# Patient Record
Sex: MALE | Race: WHITE | NOT HISPANIC OR LATINO | Employment: OTHER | URBAN - METROPOLITAN AREA
[De-identification: names, ages, dates, MRNs, and addresses within clinical notes are randomized per-mention and may not be internally consistent; named-entity substitution may affect disease eponyms.]

---

## 2023-04-03 ENCOUNTER — HOSPITAL ENCOUNTER (INPATIENT)
Facility: HOSPITAL | Age: 63
LOS: 2 days | Discharge: HOME | DRG: 65 | End: 2023-04-05
Attending: INTERNAL MEDICINE | Admitting: INTERNAL MEDICINE
Payer: MEDICARE

## 2023-04-03 DIAGNOSIS — Z00.6 EXAMINATION FOR NORMAL COMPARISON FOR CLINICAL RESEARCH: ICD-10-CM

## 2023-04-03 PROBLEM — D64.9 ANEMIA: Status: ACTIVE | Noted: 2023-04-03

## 2023-04-03 PROBLEM — Z87.19 HISTORY OF GI BLEED: Status: ACTIVE | Noted: 2023-04-03

## 2023-04-03 PROBLEM — I65.09 VERTEBRAL ARTERY STENOSIS: Status: ACTIVE | Noted: 2023-04-03

## 2023-04-03 PROBLEM — I65.01 STENOSIS OF RIGHT VERTEBRAL ARTERY: Status: ACTIVE | Noted: 2023-04-03

## 2023-04-03 PROBLEM — I48.19 ATRIAL FIBRILLATION, PERSISTENT: Status: ACTIVE | Noted: 2023-04-03

## 2023-04-03 LAB
ABO GROUP BLD: NORMAL
ALBUMIN SERPL-MCNC: 3.9 G/DL (ref 3.5–5.2)
ALBUMIN/GLOB SERPL: 1.6 G/DL
ALP SERPL-CCNC: 81 U/L (ref 39–117)
ALT SERPL W P-5'-P-CCNC: 21 U/L (ref 1–41)
AMPHET+METHAMPHET UR QL: NEGATIVE
AMPHETAMINES UR QL: NEGATIVE
ANION GAP SERPL CALCULATED.3IONS-SCNC: 10 MMOL/L (ref 5–15)
AST SERPL-CCNC: 21 U/L (ref 1–40)
BARBITURATES UR QL SCN: NEGATIVE
BASOPHILS # BLD AUTO: 0.05 10*3/MM3 (ref 0–0.2)
BASOPHILS NFR BLD AUTO: 0.8 % (ref 0–1.5)
BENZODIAZ UR QL SCN: NEGATIVE
BILIRUB SERPL-MCNC: 0.7 MG/DL (ref 0–1.2)
BLD GP AB SCN SERPL QL: NEGATIVE
BUN SERPL-MCNC: 16 MG/DL (ref 8–23)
BUN/CREAT SERPL: 12.9 (ref 7–25)
BUPRENORPHINE SERPL-MCNC: NEGATIVE NG/ML
CALCIUM SPEC-SCNC: 8.7 MG/DL (ref 8.6–10.5)
CANNABINOIDS SERPL QL: POSITIVE
CHLORIDE SERPL-SCNC: 106 MMOL/L (ref 98–107)
CO2 SERPL-SCNC: 26 MMOL/L (ref 22–29)
COCAINE UR QL: NEGATIVE
CREAT SERPL-MCNC: 1.24 MG/DL (ref 0.76–1.27)
DEPRECATED RDW RBC AUTO: 46 FL (ref 37–54)
EGFRCR SERPLBLD CKD-EPI 2021: 64.9 ML/MIN/1.73
EOSINOPHIL # BLD AUTO: 0.03 10*3/MM3 (ref 0–0.4)
EOSINOPHIL NFR BLD AUTO: 0.5 % (ref 0.3–6.2)
ERYTHROCYTE [DISTWIDTH] IN BLOOD BY AUTOMATED COUNT: 16.7 % (ref 12.3–15.4)
GLOBULIN UR ELPH-MCNC: 2.4 GM/DL
GLUCOSE BLDC GLUCOMTR-MCNC: 153 MG/DL (ref 70–130)
GLUCOSE SERPL-MCNC: 108 MG/DL (ref 65–99)
HCT VFR BLD AUTO: 29.9 % (ref 37.5–51)
HGB BLD-MCNC: 8.2 G/DL (ref 13–17.7)
HYPOCHROMIA BLD QL: NORMAL
IMM GRANULOCYTES # BLD AUTO: 0.01 10*3/MM3 (ref 0–0.05)
IMM GRANULOCYTES NFR BLD AUTO: 0.2 % (ref 0–0.5)
IRON 24H UR-MRATE: 14 MCG/DL (ref 59–158)
IRON SATN MFR SERPL: 3 % (ref 20–50)
LYMPHOCYTES # BLD AUTO: 0.97 10*3/MM3 (ref 0.7–3.1)
LYMPHOCYTES NFR BLD AUTO: 16.3 % (ref 19.6–45.3)
MAGNESIUM SERPL-MCNC: 1.9 MG/DL (ref 1.6–2.4)
MCH RBC QN AUTO: 21.1 PG (ref 26.6–33)
MCHC RBC AUTO-ENTMCNC: 27.4 G/DL (ref 31.5–35.7)
MCV RBC AUTO: 77.1 FL (ref 79–97)
METHADONE UR QL SCN: NEGATIVE
MICROCYTES BLD QL: NORMAL
MONOCYTES # BLD AUTO: 0.29 10*3/MM3 (ref 0.1–0.9)
MONOCYTES NFR BLD AUTO: 4.9 % (ref 5–12)
NEUTROPHILS NFR BLD AUTO: 4.59 10*3/MM3 (ref 1.7–7)
NEUTROPHILS NFR BLD AUTO: 77.3 % (ref 42.7–76)
NRBC BLD AUTO-RTO: 0 /100 WBC (ref 0–0.2)
OPIATES UR QL: NEGATIVE
OXYCODONE UR QL SCN: NEGATIVE
PCP UR QL SCN: NEGATIVE
PHOSPHATE SERPL-MCNC: 3.7 MG/DL (ref 2.5–4.5)
PLAT MORPH BLD: NORMAL
PLATELET # BLD AUTO: 240 10*3/MM3 (ref 140–450)
PMV BLD AUTO: 10.7 FL (ref 6–12)
POTASSIUM SERPL-SCNC: 4.8 MMOL/L (ref 3.5–5.2)
PROPOXYPH UR QL: NEGATIVE
PROT SERPL-MCNC: 6.3 G/DL (ref 6–8.5)
RBC # BLD AUTO: 3.88 10*6/MM3 (ref 4.14–5.8)
RH BLD: POSITIVE
SODIUM SERPL-SCNC: 142 MMOL/L (ref 136–145)
T&S EXPIRATION DATE: NORMAL
TIBC SERPL-MCNC: 539 MCG/DL (ref 298–536)
TRANSFERRIN SERPL-MCNC: 362 MG/DL (ref 200–360)
TRICYCLICS UR QL SCN: NEGATIVE
WBC MORPH BLD: NORMAL
WBC NRBC COR # BLD: 5.94 10*3/MM3 (ref 3.4–10.8)

## 2023-04-03 PROCEDURE — 83735 ASSAY OF MAGNESIUM: CPT

## 2023-04-03 PROCEDURE — 86901 BLOOD TYPING SEROLOGIC RH(D): CPT

## 2023-04-03 PROCEDURE — 85007 BL SMEAR W/DIFF WBC COUNT: CPT

## 2023-04-03 PROCEDURE — 80306 DRUG TEST PRSMV INSTRMNT: CPT

## 2023-04-03 PROCEDURE — 86850 RBC ANTIBODY SCREEN: CPT

## 2023-04-03 PROCEDURE — 86900 BLOOD TYPING SEROLOGIC ABO: CPT

## 2023-04-03 PROCEDURE — 84466 ASSAY OF TRANSFERRIN: CPT

## 2023-04-03 PROCEDURE — 85025 COMPLETE CBC W/AUTO DIFF WBC: CPT

## 2023-04-03 PROCEDURE — 80053 COMPREHEN METABOLIC PANEL: CPT

## 2023-04-03 PROCEDURE — 82962 GLUCOSE BLOOD TEST: CPT

## 2023-04-03 PROCEDURE — 99223 1ST HOSP IP/OBS HIGH 75: CPT | Performed by: INTERNAL MEDICINE

## 2023-04-03 PROCEDURE — 83540 ASSAY OF IRON: CPT

## 2023-04-03 PROCEDURE — 84100 ASSAY OF PHOSPHORUS: CPT

## 2023-04-03 PROCEDURE — 63710000001 INSULIN REGULAR HUMAN PER 5 UNITS: Performed by: INTERNAL MEDICINE

## 2023-04-03 PROCEDURE — 99223 1ST HOSP IP/OBS HIGH 75: CPT | Performed by: NURSE PRACTITIONER

## 2023-04-03 RX ORDER — SODIUM CHLORIDE 9 MG/ML
75 INJECTION, SOLUTION INTRAVENOUS CONTINUOUS
Status: DISCONTINUED | OUTPATIENT
Start: 2023-04-03 | End: 2023-04-04

## 2023-04-03 RX ORDER — SODIUM CHLORIDE 0.9 % (FLUSH) 0.9 %
10 SYRINGE (ML) INJECTION AS NEEDED
Status: DISCONTINUED | OUTPATIENT
Start: 2023-04-03 | End: 2023-04-04

## 2023-04-03 RX ORDER — SODIUM CHLORIDE 9 MG/ML
40 INJECTION, SOLUTION INTRAVENOUS AS NEEDED
Status: DISCONTINUED | OUTPATIENT
Start: 2023-04-03 | End: 2023-04-04

## 2023-04-03 RX ORDER — PANTOPRAZOLE SODIUM 40 MG/10ML
40 INJECTION, POWDER, LYOPHILIZED, FOR SOLUTION INTRAVENOUS
Status: DISCONTINUED | OUTPATIENT
Start: 2023-04-04 | End: 2023-04-05 | Stop reason: HOSPADM

## 2023-04-03 RX ORDER — ATORVASTATIN CALCIUM 40 MG/1
80 TABLET, FILM COATED ORAL NIGHTLY
Status: DISCONTINUED | OUTPATIENT
Start: 2023-04-03 | End: 2023-04-05 | Stop reason: HOSPADM

## 2023-04-03 RX ORDER — ASPIRIN 325 MG
325 TABLET ORAL DAILY
Status: DISCONTINUED | OUTPATIENT
Start: 2023-04-04 | End: 2023-04-05 | Stop reason: HOSPADM

## 2023-04-03 RX ORDER — NICOTINE POLACRILEX 4 MG
15 LOZENGE BUCCAL
Status: DISCONTINUED | OUTPATIENT
Start: 2023-04-03 | End: 2023-04-05 | Stop reason: HOSPADM

## 2023-04-03 RX ORDER — SODIUM CHLORIDE 0.9 % (FLUSH) 0.9 %
10 SYRINGE (ML) INJECTION AS NEEDED
Status: DISCONTINUED | OUTPATIENT
Start: 2023-04-03 | End: 2023-04-05 | Stop reason: HOSPADM

## 2023-04-03 RX ORDER — ASPIRIN 300 MG/1
300 SUPPOSITORY RECTAL DAILY
Status: DISCONTINUED | OUTPATIENT
Start: 2023-04-04 | End: 2023-04-05 | Stop reason: HOSPADM

## 2023-04-03 RX ORDER — METOPROLOL TARTRATE 5 MG/5ML
5 INJECTION INTRAVENOUS EVERY 6 HOURS PRN
Status: DISCONTINUED | OUTPATIENT
Start: 2023-04-03 | End: 2023-04-05 | Stop reason: HOSPADM

## 2023-04-03 RX ORDER — IBUPROFEN 600 MG/1
1 TABLET ORAL
Status: DISCONTINUED | OUTPATIENT
Start: 2023-04-03 | End: 2023-04-05 | Stop reason: HOSPADM

## 2023-04-03 RX ORDER — SODIUM CHLORIDE 0.9 % (FLUSH) 0.9 %
10 SYRINGE (ML) INJECTION EVERY 12 HOURS SCHEDULED
Status: DISCONTINUED | OUTPATIENT
Start: 2023-04-03 | End: 2023-04-04

## 2023-04-03 RX ORDER — SODIUM CHLORIDE 0.9 % (FLUSH) 0.9 %
10 SYRINGE (ML) INJECTION EVERY 12 HOURS SCHEDULED
Status: DISCONTINUED | OUTPATIENT
Start: 2023-04-03 | End: 2023-04-05 | Stop reason: HOSPADM

## 2023-04-03 RX ORDER — SODIUM CHLORIDE 9 MG/ML
40 INJECTION, SOLUTION INTRAVENOUS AS NEEDED
Status: DISCONTINUED | OUTPATIENT
Start: 2023-04-03 | End: 2023-04-05 | Stop reason: HOSPADM

## 2023-04-03 RX ORDER — ONDANSETRON 2 MG/ML
4 INJECTION INTRAMUSCULAR; INTRAVENOUS EVERY 6 HOURS PRN
Status: DISCONTINUED | OUTPATIENT
Start: 2023-04-03 | End: 2023-04-05 | Stop reason: HOSPADM

## 2023-04-03 RX ORDER — DEXTROSE MONOHYDRATE 25 G/50ML
25 INJECTION, SOLUTION INTRAVENOUS
Status: DISCONTINUED | OUTPATIENT
Start: 2023-04-03 | End: 2023-04-05 | Stop reason: HOSPADM

## 2023-04-03 RX ADMIN — SODIUM CHLORIDE 75 ML/HR: 9 INJECTION, SOLUTION INTRAVENOUS at 22:25

## 2023-04-03 RX ADMIN — Medication 10 ML: at 22:26

## 2023-04-03 RX ADMIN — INSULIN HUMAN 2 UNITS: 100 INJECTION, SOLUTION PARENTERAL at 23:43

## 2023-04-03 RX ADMIN — ATORVASTATIN CALCIUM 80 MG: 40 TABLET, FILM COATED ORAL at 22:25

## 2023-04-03 RX ADMIN — Medication 10 ML: at 22:25

## 2023-04-03 NOTE — NURSING NOTE
ACC REVIEW REPORT: [facility]        PATIENT NAME: Taran Hankins    PATIENT ID: [id number]      COVID-19 ACC SCREENING       DOES THE PATIENT HAVE A FEVER GREATER THAN OR EQUAL .4: N    IS THE PATIENT EXPERIENCING SHORTNESS OF BREATH: N    DOES THE PATIENT HAVE A COUGH: N    DOES THE PATIENT HAVE ANY OF THE FOLLOWING RISK FACTORS:N    EXPOSURE TO SUSPECTED OR KNOWN COVID-19: N    RECENT TRAVEL HISTORY TO ENDEMIC AREA (DOMESTIC/LOCAL): N    IS THE PATIENT A HEALTHCARE WORKER: N    HAS THE PATIENT EXPERIENCED A LOSS OF SENSE OF TASTE OR SMELL:  N    HAS THE PATIENT BEEN TESTED FOR COVID-19: N    DATE TESTED:     LAB TESTING SENT TO:       BED: N 222    BED TYPE: ICU    BED GIVEN TO: ANA LILIA WILSON    TIME BED GIVEN: 1930    TODAY'S DATE: 4/3/2023    TRANSFER DATE: 4/3    ETA: 2030    TRANSFERRING FACILITY: [facility]    TRANSFERRING FACILITY PHONE # : [phone] EXT 4    TRANSFERRING MD: XOCHITL HANKINS    ACCEPTING PROVIDER: NAVIGATOR    NEUROLOGY PHYSICIAN: MICKEY    DATE/TIME REQUEST RECEIVED: 4/3 AT 1846    [facility] RN: BURKE AGUERO RN    REPORT FROM: ANA LILIA WILSON    TIME REPORT TAKEN: 1935    DIAGNOSIS: CVA    REASON FOR TRANSFER TO [facility]: STROKE CARE POST TPA    TRANSPORTATION: AIR EVAC    CLINICAL REASON FOR TRANSFER TO [facility]: STROKE CARE POST TPA      CLINICAL INFORMATION    HEIGHT: 6FT4IN    WEIGHT: 119.3KG    ALLERGIES: SULFA ABX    INFECTIOUS DISEASE: NO    ISOLATION: N    VITAL SIGNS:   TIME: A930  TEMP: 98.1  PULSE: 68  B/P: 145/77  RESP: 16      LAB INFORMATION: GLUCOSE 128 , H/H 8.3/29.8    CULTURE INFORMATION:     MEDS/IV FLUIDS: 20G RAC, 20G LAC  TPA IS ONLY MED GIVEN      CARDIAC SYSTEM:    CHEST PAIN: N    RATE: N    SCALE: N    RHYTHM: NSR    Is patient taking or has patient been given any drugs that could increase bleeding? Y      DRUG: TPA 9MG BOLUS, 81MG OVER 1 HR INFUSING. BEGUN AT 1930 IN 20G IV     DOSE/FREQUENCY: ONCE    TROPONIN:    DATE:   TIME:   TROP:     DATE:   TIME:   TROP:        HEART CATH:     HEART CATH DATE:     HEART CATH RESULTS:     LAD:   RCA:   CX:   LMAIN:   EF:     SWAN:     SITE:   SIZE:    DATE INSERTED:     ARTLINE:     SITE:   SIZE:   DATE INSERTED:     SHEATH:    SITE:   SIZE:   DATE INSERTED:         VASOSEAL:    SITE:   DATE INSERTED:     EXTERNAL PACEMAKER:     RATE:   EXT PACER ON:     MODE:    DATE INSERTED:   OUTPUT SETTING:   SENSITIVITY SETTING:   SENSITIVITY TYPE:     IABP:    SITE:   AUG PRESSURE:   DATE INSERTED:     CARDIAC NOTES: HX AFIB. DOES NOT TAKE BLOOD THINNERS DUE TO HX OF GI BLEED       RESPIRATORY SYSTEM:    LUNG SOUNDS:     ABG DATE:         ABG TIME:     ABG RESULTS:    PH:   PCO2:   PO2:   HCO3:   O2 SAT:     ETT SIZE:     ORAL:     NASAL:     SECURED AT MEASUREMENT (CM):     ON VENTILATOR:    TV:   FI02:   RATE:   PEEP:     OXYGEN: 2L/NC    O2 SAT: 100%    IMAGING FINDINGS: CT NEGATIVE, CTA STENOSIS . NO LG VESSSEL OCCLUSION    PNEUMO CHEST TUBE SEAL TYPE:     RESPIRATORY STATUS: HX COPD. WAS SOA AT 1600 WHEN LAST KNOWN WELL AT HOME      CNS/MUSCULOSKELETAL      NEYDA COMA SCALE:    E:   M:   V:     LAST KNOWN WELL: 1600          NIHSS    Survey Item  0: Means Alert  1: Drowsy or Answer Correctly  2: Incorrect, Forced, Can't Resist Gravity  3: Complete or No Effort  4: No Movement  NT: Not Testable Acceptable As Noted Above      1A: Level of Consciousness: 0 0    1B: LOC Questions (month, age) : 0 0    1C: LOC Commands (open/close eyes, make a fist & let go): 0    2:  Best Gaze (eyes open-pt follows examiner's fingers or face): 0    3:  Visual (introduce visual stimulus/threat to pt's visual field quad. Cover 1 eye and hold up fingers in all 4 quadrants) : 0    4.  Facial Palsy (show teeth, raise eyebrows and squeeze eyes tightly shut): 0    5A: Motor Arm-Left (elevate extremity to 90 degrees and score drift/movement.  Count to 10 aloud and use fingers for visual cue): 0    5B:  Motor Arm-Right (elevate extremity to 90 degrees and score  drift/movement.  Count to 10 aloud and use fingers for visual cue): 0    6A:  Motor Leg-Left (elevate extremity to 30 degrees and score drift/movement.  Count to 5 out loud and use fingers for visual cue): 0    6B:  Motor Leg-Right (elevate extremity to 30 degrees and score drift/movement.  Count to 5 out loud and use fingers for visual cue): 0    7:  Limb Ataxia- finger to nose, heel down shin: 0    8:  Sensory- pin prick to face, arms, trunk, and legs. Compare sharpness side to side: 1    9:  Best Language- name, items, describe picture, and read sentences.  Do not forget glasses if they normally wear them: 0    10: Dysarthria- elevate speech clarity by pt reading or repeating words on a list: 1    11: Extinction and Inattention- Use information from prior testing or double simultaneous stimuli testing to identify neglect. Face, arms, legs and visual field: 0    Total NIHSS Score: 2    Date: 4/3  Time of NIHSS Assessment: 1620        SIZE OF HEMORRHAGE:     CAT SCAN RESULTS: SURYA    MRI RESULTS:     CNS/MUSCULOSKELETAL NOTES: STEWART WELL, DIZZY WHEN OUT OF BED, UNSTEADY GAIT      GI//GY      ABDOMINAL PAIN:     VOMITING:     DIARRHEA:     NAUSEA: AT 1600 WHEN LAST KNOWN WELL    BOWEL SOUNDS:     OCCULT STOOL:     HEMATURIA:     NG TUBE:    SIZE:   DATE INSERTED:       ULTRASOUND RESULTS:       ACUTE ABDOMEN RESULTS:       CT SCAN RESULTS:       GI//GY NOTES:     LITTLE:     PAST MEDICAL HISTORY: COPD, DM, AFIB-NO BLOOD THINNERS DUE TO HX GI BLEED    OTHER SYMPTOM NOTES: AT 1600 HE WAS LIFTING SOME LUMBER AND BECAME NAUSEATED, DIZZY AND SOA AND CALLED EMS    ADDITIONAL NOTES: CURRENTLY IS LETHARGIC AND RESTING. INITIALLY DECLINED TPA BUT EVENTUALLY DECIDED TO RECEIVE IT          Jerri Luciano  4/3/2023  19:40 EDT

## 2023-04-03 NOTE — CONSULTS
Stroke Consult Note    Patient Name: Taran Boswell   MRN: [id number]  Age: 62 y.o.  Sex: male  : 1960    Primary Care Physician: Connor Richard MD    TIME STROKE TEAM CALLED: EST     TIME PATIENT SEEN:  EST    Handedness: Right  Race:      Chief Complaint/Reason for Consultation: Acute onset of severe dizziness  HPI: Taran Boswell is a 62-year-old male with a PMH significant for A-fib (not on OAC d/t GIB), GIB (1 yr ago), CAD, PPM, COPD, JUAN, and HTN presents to [facility] via air EVAC from [facility] with complaints of sudden onset of severe dizziness and nausea that began at 1600 today after lifting lumber.  Symptoms have remained severe since that time.  CT head at the OSH was negative for any acute findings.  CTA H/N at OSH with severe right vertebral artery stenosis.  No LVO reported.  OSH provider reports no weakness. NIH 2 for sensory deficit and slurred speech.   tPA was administered @ 1930.  He is being transferred to [facility] for higher level of care.    On arrival to [facility], BP is 136/75.  NIH is 0.  GCS 15.  Patient states that dizziness is much improved.    Last Known Normal Date/Time: 1600 EST     Review of Systems   Constitutional: Positive for activity change and fatigue.   HENT: Negative for congestion and trouble swallowing.    Eyes: Negative for photophobia and visual disturbance.   Respiratory: Positive for shortness of breath. Negative for cough.    Gastrointestinal: Positive for nausea and vomiting.   Musculoskeletal: Positive for arthralgias and gait problem.   Neurological: Positive for dizziness and weakness.      Past Medical History:   Diagnosis Date   • Anemia    • Atrial fibrillation    • Bilateral arm fractures     Status post fall, treated with cast   • Diabetes    • History of permanent cardiac pacemaker placement    • Hypertension    • JUAN on CPAP    • Osteoarthritis     Bilateral knees     Past Surgical History:   Procedure Laterality Date   • CARDIAC  CATHETERIZATION      [facility], patient states he did not have blockage   • CHOLECYSTECTOMY     • COLONOSCOPY      6 or 7 years ago   • GASTRIC BYPASS     • HAND TENDON SURGERY Right     Dupuytren's   • UMBILICAL HERNIA REPAIR       No family history on file.  Social History     Socioeconomic History   • Marital status:    • Number of children: 2   Tobacco Use   • Smoking status: Former     Types: Cigarettes   • Tobacco comments:     Remote tobacco smoking only for a couple years quitting when he was 18 years of age   Vaping Use   • Vaping Use: Never used   Substance and Sexual Activity   • Alcohol use: Never   • Drug use: Never     Allergies   Allergen Reactions   • Bupropion Unknown - Low Severity   • Sulfa Antibiotics Unknown - Low Severity     Prior to Admission medications    Not on File         Temp:  [98.7 °F (37.1 °C)] 98.7 °F (37.1 °C)  Heart Rate:  [64-71] 68  Resp:  [18] 18  BP: (136-144)/(75-93) 136/75  Neurological Exam  Mental Status  Awake, alert and oriented to person, place and time.Alert. Oriented to person, place, and time. Speech is normal. Language is fluent with no aphasia.    Cranial Nerves  CN II: Visual acuity is normal. Visual fields full to confrontation.  CN III, IV, VI: Extraocular movements intact bilaterally. Normal lids and orbits bilaterally. Pupils equal round and reactive to light bilaterally.  CN V: Facial sensation is normal.  CN VII: Full and symmetric facial movement.  CN IX, X: Palate elevates symmetrically  CN XI: Shoulder shrug strength is normal.  CN XII: Tongue midline without atrophy or fasciculations.    Motor   Strength is 5/5 throughout all four extremities.    Sensory  Light touch is normal in upper and lower extremities.     Reflexes                                            Right                      Left  Plantar                           Downgoing                Downgoing    Coordination  Right: Finger-to-nose normal. Heel-to-shin normal.Left:  Finger-to-nose normal.    Gait    Not observed.      Physical Exam  Constitutional:       Appearance: He is obese.   HENT:      Head: Normocephalic and atraumatic.   Eyes:      General: Lids are normal.      Extraocular Movements: Extraocular movements intact.      Pupils: Pupils are equal, round, and reactive to light.   Cardiovascular:      Rate and Rhythm: Normal rate. Rhythm irregular.   Pulmonary:      Effort: Pulmonary effort is normal. No respiratory distress.   Abdominal:      General: There is no distension.      Palpations: Abdomen is soft.   Musculoskeletal:         General: Normal range of motion.      Cervical back: Normal range of motion and neck supple.   Skin:     General: Skin is warm and dry.      Capillary Refill: Capillary refill takes less than 2 seconds.      Coloration: Skin is pale.   Neurological:      General: No focal deficit present.      Mental Status: He is alert and oriented to person, place, and time. Mental status is at baseline.      Motor: Motor strength is normal.   Psychiatric:         Mood and Affect: Mood normal.         Speech: Speech normal.         Behavior: Behavior normal.         Acute Stroke Data    Thrombolytic Inclusion / Exclusion Criteria    Time: 2047 EDT  Person Administering Scale: NEFTALI Cabrera    Inclusion Criteria  [x]   18 years of age or greater   [x]   Onset of symptoms < 4.5 hours before beginning treatment (stroke onset = time patient was last seen well or without symptoms).   []   Diagnosis of acute ischemic stroke causing measurable disabling deficit (Complete Hemianopia, Any Aphasia, Visual or Sensory Extinction, Any weakness limiting sustained effort against gravity)   [x]   Any remaining deficit considered potentially disabling in view of patient and practitioner   Exclusion criteria (Do not proceed with Alteplase if any are checked under exclusion criteria)  []   Onset unknown or GREATER than 4.5 hours   []   ICH on CT/MRI   []   CT  demonstrates hypodensity representing acute or subacute infarct   []   Significant head trauma or prior stroke in the previous 3 months   []   Symptoms suggestive of subarachnoid hemorrhage   []   History of un-ruptured intracranial aneurysm GREATER than 10 mm   []   Recent intracranial or intraspinal surgery within the last 3 months   []   Arterial puncture at a non-compressible site in the previous 7 days   []   Active internal bleeding   []   Acute bleeding tendency   []   Platelet count LESS than 100,000 for known hematological diseases such as leukemia, thrombocytopenia or chronic cirrhosis   []   Current use of anticoagulant with INR GREATER than 1.7 or PT GREATER than 15 seconds, aPTT GREATER than 40 seconds   []   Heparin received within 48 hours, resulting in abnormally elevated aPTT GREATER than upper limit of normal   []   Current use of direct thrombin inhibitors or direct factor Xa inhibitors in the past 48 hours   []   Elevated blood pressure refractory to treatment (systolic GREATER than 185 mm/Hg or diastolic  GREATER than 110 mm/Hg   []   Suspected infective endocarditis and aortic arch dissection   []   Current use of therapeutic treatment dose of low-molecular-weight heparin (LMWH) within the previous 24 hours   []   Structural GI malignancy or bleed   Relative exclusion for all patients  []   Only minor non-disabling symptoms   []   Pregnancy   []   Seizure at onset with postictal residual neurological impairments   []   Major surgery or previous trauma within past 14 days   []   History of previous spontaneous ICH, intracranial neoplasm, or AV malformation   []   Postpartum (within previous 14 days)   []   Recent GI or urinary tract hemorrhage (within previous 21 days)   []   Recent acute MI (within previous 3 months)   []   History of un-ruptured intracranial aneurysm LESS than 10 mm   []   History of ruptured intracranial aneurysm   []   Blood glucose LESS than 50 mg/dL (2.7 mmol/L)   []    Dural puncture within the last 7 days   []   Known GREATER than 10 cerebral microbleeds   Additional exclusions for patients with symptoms onset between 3 and 4.5 hours.  []   Age > 80.   []   On any anticoagulants regardless of INR  >>> Warfarin (Coumadin), Heparin, Enoxaparin (Lovenox), fondaparinux (Arixtra), bivalirudin (Angiomax), Argatroban, dabigatran (Pradaxa), rivaroxaban (Xarelto), or apixaban (Eliquis)   []   Severe stroke (NIHSS > 25).   []   History of BOTH diabetes and previous ischemic stroke.   []   The risks and benefits have been discussed with the patient or family related to the administration of IV thrombolytic therapy for stroke symptoms.   []   I have discussed and reviewed the patient's case and imaging with the attending prior to IV thrombolytic therapy.   1930 Time IV thrombolytic administered       [facility] Meds:  Scheduled-   Infusions- sodium chloride, 75 mL/hr, Last Rate: 75 mL/hr (04/03/23 2225)       PRNs-     Functional Status Prior to Current Stroke/[place] Score: 0    NIH Stroke Scale  Time: 2047 EDT  Person Administering Scale: NEFTALI Cabrera     1a. Level of Consciousness: 0-->Alert, keenly responsive  1b. LOC Questions: 0-->Answers both questions correctly  1c. LOC Commands: 0-->Performs both tasks correctly  2. Best Gaze: 0-->Normal  3. Visual: 0-->No visual loss  4. Facial Palsy: 0-->Normal symmetrical movements  5a. Motor Arm, Left: 0-->No drift, limb holds 90 (or 45) degrees for full 10 secs  5b. Motor Arm, Right: 0-->No drift, limb holds 90 (or 45) degrees for full 10 secs  6a. Motor Leg, Left: 0-->No drift, leg holds 30 degree position for full 5 secs  6b. Motor Leg, Right: 0-->No drift, leg holds 30 degree position for full 5 secs  7. Limb Ataxia: 0-->Absent  8. Sensory: 0-->Normal, no sensory loss  9. Best Language: 0-->No aphasia, normal  10. Dysarthria: 0-->Normal  11. Extinction and Inattention (formerly Neglect): 0-->No abnormality    Total (NIH Stroke  Scale): 0    Results Reviewed:  I have personally reviewed current lab, radiology, and data.       Assessment/Plan:    62-year-old male with a PMH significant for A-fib (not on OAC d/t GIB), GIB (1 yr ago), CAD, PPM, COPD, and HTN presents to [facility] via air EVAC from [facility] with complaints of sudden onset of severe dizziness and nausea that began at 1600 today after lifting lumber.  Symptoms have remained severe since that time.  CT head at the OSH was negative for any acute findings.  CTA H/N at OSH with severe right vertebral artery stenosis.  No LVO reported.  OSH provider reports no weakness, sensory deficit, or speech difficulty noted.  tPA was administered.  He is being transferred to [facility] for higher level of care.    Antiplatelet PTA: None  Anticoagulant PTA: None      Suspected AIS s/p tPA  - Admit to the ICU  - Ischemic stroke with thrombolytic therapy order set  - NIH 0; Dizziness is improving  - MRI brain tomorrow.  Will need PPM clearance.  Cardiology consult placed  - 24-hour CT head at 1900 4/4  - Aspirin if 24-hour CT head is negative for hemorrhage  - High intensity statin  - TTE pending  -JUAN; management per primary team.  Patient uses CPAP at home.  - A-fib; not currently on OAC secondary to history of GI bleed last year.  Recheck CBC in the AM.  Hemoglobin at OSH 8.3.  - HTN; SBP less than 180.  Nicardipine drip as needed  - Hemoglobin A1c, lipid panel in the a.m.  - Bedrest overnight  - N.p.o.  - PT/OT/SLP; can evaluate the patient in the a.m.  - Plan discussed with the patient, Dr. Gerhardtstein, and nursing staff.  Stroke neurology will continue to follow.  Please call with any questions or concerns.    NEFTALI Cabrera, AGACNP-BC  April 3, 2023  19:39 EDT

## 2023-04-04 ENCOUNTER — APPOINTMENT (OUTPATIENT)
Dept: OTHER | Facility: HOSPITAL | Age: 63
DRG: 65 | End: 2023-04-04
Payer: MEDICARE

## 2023-04-04 ENCOUNTER — APPOINTMENT (OUTPATIENT)
Dept: CT IMAGING | Facility: HOSPITAL | Age: 63
DRG: 65 | End: 2023-04-04
Payer: MEDICARE

## 2023-04-04 ENCOUNTER — APPOINTMENT (OUTPATIENT)
Dept: CARDIOLOGY | Facility: HOSPITAL | Age: 63
DRG: 65 | End: 2023-04-04
Payer: MEDICARE

## 2023-04-04 LAB
ABO GROUP BLD: NORMAL
BH CV ECHO MEAS - AO MAX PG: 8.3 MMHG
BH CV ECHO MEAS - AO MEAN PG: 4 MMHG
BH CV ECHO MEAS - AO ROOT DIAM: 3.1 CM
BH CV ECHO MEAS - AO V2 MAX: 144 CM/SEC
BH CV ECHO MEAS - AO V2 VTI: 33.1 CM
BH CV ECHO MEAS - AVA(I,D): 3.4 CM2
BH CV ECHO MEAS - EDV(CUBED): 185.2 ML
BH CV ECHO MEAS - EDV(MOD-SP2): 182 ML
BH CV ECHO MEAS - EDV(MOD-SP4): 173 ML
BH CV ECHO MEAS - EF(MOD-BP): 55.6 %
BH CV ECHO MEAS - EF(MOD-SP2): 57 %
BH CV ECHO MEAS - EF(MOD-SP4): 55.4 %
BH CV ECHO MEAS - ESV(CUBED): 59.3 ML
BH CV ECHO MEAS - ESV(MOD-SP2): 78.2 ML
BH CV ECHO MEAS - ESV(MOD-SP4): 77.1 ML
BH CV ECHO MEAS - FS: 31.6 %
BH CV ECHO MEAS - IVS/LVPW: 0.62 CM
BH CV ECHO MEAS - IVSD: 0.8 CM
BH CV ECHO MEAS - LA DIMENSION: 4.5 CM
BH CV ECHO MEAS - LAT PEAK E' VEL: 10.7 CM/SEC
BH CV ECHO MEAS - LV MASS(C)D: 241.3 GRAMS
BH CV ECHO MEAS - LV MAX PG: 6.2 MMHG
BH CV ECHO MEAS - LV MEAN PG: 3 MMHG
BH CV ECHO MEAS - LV V1 MAX: 124 CM/SEC
BH CV ECHO MEAS - LV V1 VTI: 29.4 CM
BH CV ECHO MEAS - LVIDD: 5.7 CM
BH CV ECHO MEAS - LVIDS: 3.9 CM
BH CV ECHO MEAS - LVOT AREA: 3.8 CM2
BH CV ECHO MEAS - LVOT DIAM: 2.2 CM
BH CV ECHO MEAS - LVPWD: 1.3 CM
BH CV ECHO MEAS - MED PEAK E' VEL: 9.3 CM/SEC
BH CV ECHO MEAS - MR MAX PG: 118.8 MMHG
BH CV ECHO MEAS - MR MAX VEL: 545 CM/SEC
BH CV ECHO MEAS - MR MEAN PG: 78 MMHG
BH CV ECHO MEAS - MR MEAN VEL: 419 CM/SEC
BH CV ECHO MEAS - MR VTI: 205 CM
BH CV ECHO MEAS - MV DEC SLOPE: 580 CM/SEC2
BH CV ECHO MEAS - MV DEC TIME: 0.22 MSEC
BH CV ECHO MEAS - MV E MAX VEL: 130 CM/SEC
BH CV ECHO MEAS - MV MAX PG: 7.7 MMHG
BH CV ECHO MEAS - MV MEAN PG: 3 MMHG
BH CV ECHO MEAS - MV V2 VTI: 38.8 CM
BH CV ECHO MEAS - MVA(VTI): 2.9 CM2
BH CV ECHO MEAS - PA ACC TIME: 0.12 SEC
BH CV ECHO MEAS - PA PR(ACCEL): 26.6 MMHG
BH CV ECHO MEAS - PA V2 MAX: 141.5 CM/SEC
BH CV ECHO MEAS - RAP SYSTOLE: 8 MMHG
BH CV ECHO MEAS - RVSP: 45 MMHG
BH CV ECHO MEAS - SV(LVOT): 111.8 ML
BH CV ECHO MEAS - SV(MOD-SP2): 103.8 ML
BH CV ECHO MEAS - SV(MOD-SP4): 95.9 ML
BH CV ECHO MEAS - TAPSE (>1.6): 2.19 CM
BH CV ECHO MEAS - TR MAX PG: 37.3 MMHG
BH CV ECHO MEAS - TR MAX VEL: 303.8 CM/SEC
BH CV ECHO MEASUREMENTS AVERAGE E/E' RATIO: 13
BH CV ECHO SHUNT ASSESSMENT PERFORMED (HIDDEN SCRIPTING): 1
BH CV XLRA - RV BASE: 4.2 CM
BH CV XLRA - RV LENGTH: 7.2 CM
BH CV XLRA - RV MID: 3.6 CM
BH CV XLRA - TDI S': 10.6 CM/SEC
CHOLEST SERPL-MCNC: 102 MG/DL (ref 0–200)
GLUCOSE BLDC GLUCOMTR-MCNC: 100 MG/DL (ref 70–130)
GLUCOSE BLDC GLUCOMTR-MCNC: 101 MG/DL (ref 70–130)
GLUCOSE BLDC GLUCOMTR-MCNC: 108 MG/DL (ref 70–130)
GLUCOSE BLDC GLUCOMTR-MCNC: 179 MG/DL (ref 70–130)
HBA1C MFR BLD: 5.1 % (ref 4.8–5.6)
HDLC SERPL-MCNC: 31 MG/DL (ref 40–60)
LDLC SERPL CALC-MCNC: 54 MG/DL (ref 0–100)
LDLC/HDLC SERPL: 1.75 {RATIO}
LEFT ATRIUM VOLUME INDEX: 49.8 ML/M2
MAXIMAL PREDICTED HEART RATE: 156 BPM
RH BLD: POSITIVE
STRESS TARGET HR: 133 BPM
TRIGL SERPL-MCNC: 84 MG/DL (ref 0–150)
VLDLC SERPL-MCNC: 17 MG/DL (ref 5–40)

## 2023-04-04 PROCEDURE — 94799 UNLISTED PULMONARY SVC/PX: CPT

## 2023-04-04 PROCEDURE — 94660 CPAP INITIATION&MGMT: CPT

## 2023-04-04 PROCEDURE — 92523 SPEECH SOUND LANG COMPREHEN: CPT

## 2023-04-04 PROCEDURE — 99233 SBSQ HOSP IP/OBS HIGH 50: CPT | Performed by: PSYCHIATRY & NEUROLOGY

## 2023-04-04 PROCEDURE — 70450 CT HEAD/BRAIN W/O DYE: CPT

## 2023-04-04 PROCEDURE — 83036 HEMOGLOBIN GLYCOSYLATED A1C: CPT | Performed by: NURSE PRACTITIONER

## 2023-04-04 PROCEDURE — 63710000001 INSULIN LISPRO (HUMAN) PER 5 UNITS

## 2023-04-04 PROCEDURE — 80061 LIPID PANEL: CPT | Performed by: NURSE PRACTITIONER

## 2023-04-04 PROCEDURE — 82962 GLUCOSE BLOOD TEST: CPT

## 2023-04-04 PROCEDURE — 99233 SBSQ HOSP IP/OBS HIGH 50: CPT | Performed by: INTERNAL MEDICINE

## 2023-04-04 PROCEDURE — 93306 TTE W/DOPPLER COMPLETE: CPT

## 2023-04-04 PROCEDURE — 93306 TTE W/DOPPLER COMPLETE: CPT | Performed by: INTERNAL MEDICINE

## 2023-04-04 RX ORDER — INSULIN LISPRO 100 [IU]/ML
0-7 INJECTION, SOLUTION INTRAVENOUS; SUBCUTANEOUS
Status: DISCONTINUED | OUTPATIENT
Start: 2023-04-04 | End: 2023-04-05 | Stop reason: HOSPADM

## 2023-04-04 RX ORDER — METOPROLOL SUCCINATE 100 MG/1
100 TABLET, EXTENDED RELEASE ORAL DAILY
COMMUNITY

## 2023-04-04 RX ORDER — SERTRALINE HYDROCHLORIDE 100 MG/1
100 TABLET, FILM COATED ORAL DAILY
COMMUNITY

## 2023-04-04 RX ORDER — AMLODIPINE BESYLATE 10 MG/1
10 TABLET ORAL
Status: DISCONTINUED | OUTPATIENT
Start: 2023-04-04 | End: 2023-04-05 | Stop reason: HOSPADM

## 2023-04-04 RX ORDER — NAPROXEN 500 MG/1
500 TABLET ORAL 2 TIMES DAILY PRN
COMMUNITY

## 2023-04-04 RX ORDER — AMLODIPINE BESYLATE AND BENAZEPRIL HYDROCHLORIDE 5; 20 MG/1; MG/1
1 CAPSULE ORAL DAILY
COMMUNITY

## 2023-04-04 RX ORDER — ALLOPURINOL 300 MG/1
300 TABLET ORAL DAILY
COMMUNITY

## 2023-04-04 RX ORDER — OMEPRAZOLE 40 MG/1
40 CAPSULE, DELAYED RELEASE ORAL DAILY
COMMUNITY

## 2023-04-04 RX ADMIN — ATORVASTATIN CALCIUM 80 MG: 40 TABLET, FILM COATED ORAL at 20:04

## 2023-04-04 RX ADMIN — PANTOPRAZOLE SODIUM 40 MG: 40 INJECTION, POWDER, LYOPHILIZED, FOR SOLUTION INTRAVENOUS at 06:24

## 2023-04-04 RX ADMIN — AMLODIPINE BESYLATE 10 MG: 10 TABLET ORAL at 20:04

## 2023-04-04 RX ADMIN — INSULIN LISPRO 2 UNITS: 100 INJECTION, SOLUTION INTRAVENOUS; SUBCUTANEOUS at 11:58

## 2023-04-04 RX ADMIN — Medication 10 ML: at 20:05

## 2023-04-04 RX ADMIN — ASPIRIN 325 MG: 325 TABLET ORAL at 20:04

## 2023-04-04 RX ADMIN — SODIUM CHLORIDE 75 ML/HR: 9 INJECTION, SOLUTION INTRAVENOUS at 12:07

## 2023-04-04 RX ADMIN — METOPROLOL TARTRATE 5 MG: 5 INJECTION INTRAVENOUS at 19:50

## 2023-04-04 NOTE — PROGRESS NOTES
Neurology Note    Patient:  Taran Boswell    YOB: 1960    REFERRING PHYSICIAN:  Loni Qiu MD    CHIEF COMPLAINT:    diiness    HISTORY OF PRESENT ILLNESS:   The patient reports feeling better, no longer dizzy, reports severe vertigo and nausea last night. NIHSS 0. SBP 120s-170s. Remains in A.fib. Unable to have an MRI 22 incompatible PPM.    Past Medical History:  Past Medical History:   Diagnosis Date   • Anemia    • Atrial fibrillation    • Bilateral arm fractures     Status post fall, treated with cast   • Diabetes    • History of permanent cardiac pacemaker placement    • Hypertension    • JUAN on CPAP    • Osteoarthritis     Bilateral knees       Past Surgical History:  Past Surgical History:   Procedure Laterality Date   • CARDIAC CATHETERIZATION      [facility], patient states he did not have blockage   • CHOLECYSTECTOMY     • COLONOSCOPY      6 or 7 years ago   • GASTRIC BYPASS     • HAND TENDON SURGERY Right     Dupuytren's   • UMBILICAL HERNIA REPAIR         Social History:   Social History     Socioeconomic History   • Marital status:    • Number of children: 2   Tobacco Use   • Smoking status: Former     Types: Cigarettes   • Tobacco comments:     Remote tobacco smoking only for a couple years quitting when he was 18 years of age   Vaping Use   • Vaping Use: Never used   Substance and Sexual Activity   • Alcohol use: Never   • Drug use: Never        Family History:   No family history on file.    Medications Prior to Admission:    Prior to Admission medications    Medication Sig Start Date End Date Taking? Authorizing Provider   allopurinol (ZYLOPRIM) 300 MG tablet Take 1 tablet by mouth Daily.    ProviderKasi MD   amLODIPine-benazepril (LOTREL 5-20) 5-20 MG per capsule Take 1 capsule by mouth Daily.    Kasi Mckenna MD   metoprolol succinate XL (TOPROL-XL) 100 MG 24 hr tablet Take 1 tablet by mouth Daily.    Kasi Mckenna MD   naproxen  (NAPROSYN) 500 MG tablet Take 1 tablet by mouth 2 (Two) Times a Day As Needed for Mild Pain.    Provider, MD Kasi   omeprazole (priLOSEC) 40 MG capsule Take 1 capsule by mouth Daily.    Provider, MD Kasi   sertraline (ZOLOFT) 100 MG tablet Take 1 tablet by mouth Daily.    Provider, MD Kasi       Allergies:  Bupropion and Sulfa antibiotics      Review of system  Review of Systems   HENT: Negative for trouble swallowing.    Neurological: Negative for facial asymmetry, speech difficulty, weakness and numbness.   All other systems reviewed and are negative.      Vitals:    04/04/23 1300   BP:    Pulse:    Resp: 20   Temp:    SpO2:        Physical exam  Physical Exam  Constitutional:       Appearance: He is well-developed.   Eyes:      Extraocular Movements: Extraocular movements intact.      Pupils: Pupils are equal, round, and reactive to light.   Cardiovascular:      Rate and Rhythm: Normal rate and regular rhythm.   Pulmonary:      Effort: Pulmonary effort is normal.   Neurological:      Mental Status: He is alert and oriented to person, place, and time.      Deep Tendon Reflexes: Reflexes are normal and symmetric.      Comments: Speech clear, VFF, no facial droop, no limb drift.   Psychiatric:         Behavior: Behavior normal.         Thought Content: Thought content normal.           Lab Results   Component Value Date    WBC 5.94 04/03/2023    HGB 8.2 (L) 04/03/2023    HCT 29.9 (L) 04/03/2023    MCV 77.1 (L) 04/03/2023     04/03/2023     Lab Results   Component Value Date    GLUCOSE 108 (H) 04/03/2023    BUN 16 04/03/2023    CREATININE 1.24 04/03/2023    BCR 12.9 04/03/2023    CO2 26.0 04/03/2023    CALCIUM 8.7 04/03/2023    ALBUMIN 3.9 04/03/2023    AST 21 04/03/2023    ALT 21 04/03/2023               During this visit the following were done:  Labs Reviewed [x]    Labs Ordered []    Radiology Reports Reviewed [x]    Radiology Ordered []    EKG, echo, and/or stress test reviewed [x]     EEG results reviewed  []    EEG reviewed and interpreted per myself   []    Discussed case with neurointerventionalist or neuroradiologist []    Referring Provider Records Reviewed []    ER Records Reviewed []    Hospital Records Reviewed []    History Obtained From Family []    Radiological images view and Interpreted per myself [x]    Case Discussed with referring provider []     Decision to obtain and request outside records  []        Assessment and Plan     Stroke-like symptoms including vertigo, slurred speech and numbness, resolved s/p IV TPA. Outside CTA with reported severe right VA stenosis. 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 Chronic A.fib. Not on AC 22 h/o suspected GIB with reportedly negative w/u, persistent microcytic anemia. H/O gastric bypass. 24 hour CTH negative to my eye. Unable to have an MRI 22 PPM.   - Neurologically stable for telemetry.   - -180, DBP<105.   - TTE.   - GI consult in am.   - Start OAC vs antiplatelet medication if cleared by GI.        Electronically signed by Bryan Randall MD on 4/4/2023 at 13:37 EDT

## 2023-04-04 NOTE — THERAPY EVALUATION
Acute Care - Speech Language Pathology Initial Evaluation  [facility]   Cognitive-Communication Evaluation       Patient Name: Taran Boswell  : 1960  MRN: [id number]  Today's Date: 2023               Admit Date: 4/3/2023     Visit Dx:  No diagnosis found.  Patient Active Problem List   Diagnosis   • Stenosis of right vertebral artery   • Atrial fibrillation, persistent   • Anemia   • History of GI bleed   • Vertebral artery stenosis     Past Medical History:   Diagnosis Date   • Anemia    • Atrial fibrillation    • Bilateral arm fractures     Status post fall, treated with cast   • Diabetes    • History of permanent cardiac pacemaker placement    • Hypertension    • JUAN on CPAP    • Osteoarthritis     Bilateral knees     Past Surgical History:   Procedure Laterality Date   • CARDIAC CATHETERIZATION      [facility], patient states he did not have blockage   • CHOLECYSTECTOMY     • COLONOSCOPY      6 or 7 years ago   • GASTRIC BYPASS     • HAND TENDON SURGERY Right     Dupuytren's   • UMBILICAL HERNIA REPAIR         SLP Recommendation and Plan  SLP Diagnosis: functional speech/language skills, functional cognitive-linguistic skills (23 1155)           [facility] Criteria for Skilled Therapy Interventions Met: no problems identified which require skilled intervention (23 1155)  Anticipated Discharge Disposition (SLP): unknown (23 1155)                                   Plan of Care Reviewed With: patient (23 1317)      SLP EVALUATION (last 72 hours)     SLP SLC Evaluation     Row Name 23 115                   Communication Assessment/Intervention    Document Type evaluation  -KL        Subjective Information no complaints  -KL        Patient Observations alert;cooperative;agree to therapy  -        Patient/Family/Caregiver Comments/Observations none  -KL        Patient Effort good  -KL        Symptoms Noted During/After Treatment none  -KL           General Information     Patient Profile Reviewed yes  -        Pertinent History Of Current Problem Pt with PMH of A-fib, GIB, CAD, PPM, COPD, JUAN, and HTN. Pt adm w/ suspected AIS s/p tPA.  -KL        Precautions/Limitations, Vision WFL;for purposes of eval  -KL        Precautions/Limitations, Hearing WFL;for purposes of eval  -KL        Prior Level of Function-Communication WFL  -        Plans/Goals Discussed with patient;agreed upon  -        Barriers to Rehab none identified  -        Patient's Goals for Discharge patient did not state  -           Pain    Additional Documentation Pain Scale: FACES Pre/Post-Treatment (Group)  -           Pain Scale: FACES Pre/Post-Treatment    Pain: FACES Scale, Pretreatment 0-->no hurt  -        Posttreatment Pain Rating 0-->no hurt  -           Comprehension Assessment/Intervention    Comprehension Assessment/Intervention Auditory Comprehension  -           Auditory Comprehension Assessment/Intervention    Auditory Comprehension (Communication) WFL  -        Able to Identify Objects/Pictures (Communication) body part;familiar objects;WFL  -        Answers Questions (Communication) simple;complex;yes/no;wh questions;WFL  -        Able to Follow Commands (Communication) 1-step;2-step;WFL  -KL        Narrative Discourse conversational level;WFL  -KL           Expression Assessment/Intervention    Expression Assessment/Intervention verbal expression  -           Verbal Expression Assessment/Intervention    Verbal Expression WFL  -        Automatic Speech (Communication) response to greeting;WFL  -KL        Repetition words;sentences;WFL  -KL        Phrase Completion automatic/predictable;WFL  -KL        Responsive Naming simple;complex;WFL  -KL        Confrontational Naming high frequency;WFL  -KL        Spontaneous/Functional Words simple;complex;WFL  -KL        Sentence Formulation simple;complex;WFL  -KL        Conversational Discourse/Fluency WFL  -KL           Oral Motor  Structure and Function    Oral Motor Structure and Function L  -        Dentition Assessment edentulous  -        Mucosal Quality moist, healthy  -           Oral Musculature and Cranial Nerve Assessment    Oral Motor General Assessment L  -           Motor Speech Assessment/Intervention    Motor Speech Function [facility]  -           Cognitive Assessment Intervention- SLP    Cognitive Function (Cognition) [facility]  -        Orientation Status (Cognition) awareness of basic personal information;person;place;time;situation;[facility]  -        Memory (Cognitive) immediate;delayed;simple;[facility]  -        Attention (Cognitive) sustained;[facility]  -        Thought Organization (Cognitive) concrete divergent;[facility]  -        Reasoning (Cognitive) simple;mod-complex;[facility]  -KL        Problem Solving (Cognitive) simple;temporal;multifactorial;[facility]  -        Functional Math (Cognitive) simple;[facility]  -        Executive Function (Cognition) [facility]  -        Pragmatics (Communication) [facility]  -        Right Hemisphere Function [facility]  -           SLP Evaluation Clinical Impressions    SLP Diagnosis functional speech/language skills;functional cognitive-linguistic skills  -[facility] Criteria for Skilled Therapy Interventions Met no problems identified which require skilled intervention  -        Functional Impact no impact on function  -           Recommendations    Therapy Frequency (SLP [facility]) evaluation only  -        Anticipated Discharge Disposition (SLP) unknown  -              User Key  (r) = Recorded By, (t) = Taken By, (c) = Cosigned By    Initials Name Effective Dates    KL Becka Herrera, MS CCC-SLP 06/15/21 -                    EDUCATION  The patient has been educated in the following areas:     Cognitive Impairment Communication Impairment.                      Time Calculation:      Time Calculation- SLP     Row Name 04/04/23 1318             Time Calculation- [facility]    SLP Start Time 1155  -      SLP Received On  04/04/23  -KL         Untimed Charges    SLP Eval/Re-eval  ST Eval Speech and Production w/ Language - 76838  -KL      51474-SF Eval Speech and Production w/ Language Minutes 60  -KL         Total Minutes    Untimed Charges Total Minutes 60  -KL       Total Minutes 60  -KL            User Key  (r) = Recorded By, (t) = Taken By, (c) = Cosigned By    Initials Name Provider Type    Becka Dorsey MS CCC-SLP Speech and Language Pathologist                Therapy Charges for Today     Code Description Service Date Service Provider Modifiers Qty    [id number]  ST EVAL SPEECH AND PROD W LANG  4 4/4/2023 Becka Herrera MS CCC-SLP GN 1                     Becka Herrera MS CCC-NEREYDA  4/4/2023

## 2023-04-04 NOTE — CASE MANAGEMENT/SOCIAL WORK
Discharge Planning Assessment  [facility]     Patient Name: Taran Boswell  MRN: [id number]  Today's Date: 4/4/2023    Admit Date: 4/3/2023    Plan: Ongoing   Discharge Needs Assessment     Row Name 04/04/23 1042       Living Environment    People in Home spouse    Name(s) of People in Home Wife Essence Boswell    Current Living Arrangements home    Primary Care Provided by self    Provides Primary Care For no one    Family Caregiver if Needed spouse;sibling(s)    Quality of Family Relationships helpful;involved;supportive       Resource/Environmental Concerns    Resource/Environmental Concerns none       Food Insecurity    Within the past 12 months, you worried that your food would run out before you got the money to buy more. Never true    Within the past 12 months, the food you bought just didn't last and you didn't have money to get more. Never true       Transition Planning    Patient/Family Anticipates Transition to home with family    Patient/Family Anticipated Services at Transition     Transportation Anticipated family or friend will provide       Discharge Needs Assessment    Readmission Within the Last 30 Days no previous admission in last 30 days    Equipment Currently Used at Home cpap    Concerns to be Addressed discharge planning    Anticipated Changes Related to Illness none    Equipment Needed After Discharge none    Current Discharge Risk chronically ill               Discharge Plan     Row Name 04/04/23 1044       Plan    Plan Ongoing      Plan Comments I met with Mr. Boswell and his family today at the bedside to discuss discharge planning. Mr. Boswell lives in [facility] with his wife Essence. He is independent, uses a cpap at night, and is not current with any therapy. Physical and occupational therapy recommendations are pending for discharge planning. Case management will continue to follow Mr. Boswell's progress and provide for him a safe discharge plan.              Continued Care  and Services - Admitted Since 4/3/2023    Coordination has not been started for this encounter.       Expected Discharge Date and Time     Expected Discharge Date Expected Discharge Time    Apr 7, 2023          Demographic Summary     Row Name 04/04/23 1041       General Information    General Information Comments Primary provider is Connor Richard MD. Insurance provider is [employer]. No advanced directive or living will.               Functional Status     Row Name 04/04/23 1042       Functional Status, IADL    Medications independent    Meal Preparation independent    Housekeeping independent    Laundry independent    Shopping independent       Mental Status    General Appearance WDL WDL       Mental Status Summary    Recent Changes in Mental Status/Cognitive Functioning no changes       Employment/    Employment Status retired         Genia Durand, RN

## 2023-04-04 NOTE — PLAN OF CARE
Goal Outcome Evaluation:     Pt A&o x 4, STEWART,  NIH score 0, neuro exam stable overall, vitals good, passed swallow screen, tolerating food, scheduled for f/u CT and MRI today, will continue to monitor for bleeding

## 2023-04-04 NOTE — PLAN OF CARE
Goal Outcome Evaluation:  Plan of Care Reviewed With: patient     SLP evaluation completed. Pt with cognitive-communication skills WFL. SLP will sign-off. Please see note for further details and recommendations.

## 2023-04-04 NOTE — PROGRESS NOTES
"INTENSIVIST   PROGRESS NOTE     Hospital:  LOS: 1 day     Chief Complaint: Stroke     Subjective   S     Interval History: No acute issues since admission.  Patient feels subjectively better.  He is afebrile and hemodynamically stable.  Talking in full sentences.  Family at bedside who note improvement as well.    The patient's relevant past medical, surgical and social history were reviewed and updated in Epic as appropriate.      ROS: Fourteen point review of system performed and negative except for that mentioned in HPI.     Objective   O     Intake/Ouptut 24 hrs (7:00AM - 6:59 AM)  Intake & Output (last 3 days)       04/01 0701  04/02 0700 04/02 0701 04/03 0700 04/03 0701 04/04 0700 04/04 0701 04/05 0700    I.V. (mL/kg)   418.8 (3.5) 116 (1)    Total Intake(mL/kg)   418.8 (3.5) 116 (1)    Urine (mL/kg/hr)   1950     Total Output   1950     Net   -1531.3 +116                Medications (drips):  sodium chloride, Last Rate: 75 mL/hr (04/03/23 2225)    Respiratory Support: Room air      Physical Examination:  Vital Signs: Blood pressure 130/79, pulse 66, temperature 98.6 °F (37 °C), temperature source Oral, resp. rate 16, height 190.5 cm (75\"), weight 118 kg (260 lb 9.3 oz), SpO2 96 %.    General: The patient appears in no acute distress. Alert, cooperative and interactive.  ENT/Neck: Trachea midline, No masses. No JVD. MMM.   Lymphadenopathy: No palpable anterior cervical, submandibular, submental, or posterior cervical lymphadenopathy.   Chest: Clear to auscultation bilaterally, No wheezing, rhonchi, or rales. Normal work of breathing. Equal chest rise.  Cardiac: Regular rhythm, normal rate, S1S2 auscultated. No murmurs, rubs or gallops.   Abdomen: Soft, non-tender, non-distended, positive bowel sounds in all four quadrants.   Extremities: No lower extremity edema. No clubbing or cyanosis.  Skin: No rashes, open wounds, or bruising. Warm, dry, well-perfused.  Neuro: Motor power grossly intact bilaterally. " Sensation intact. Speech fluid and fluent. Thought process coherent.  Psych: Alert and oriented x3. Mood stable.    Lines, Drains & Airways     Active LDAs     Name Placement date Placement time Site Days    Peripheral IV 04/03/23 2108 Left Antecubital 04/03/23 2108  Antecubital  less than 1    Peripheral IV 04/03/23 Right Antecubital 04/03/23  --  Antecubital  1              Results from last 7 days   Lab Units 04/03/23  2153   WBC 10*3/mm3 5.94   HEMOGLOBIN g/dL 8.2*   MCV fL 77.1*   PLATELETS 10*3/mm3 240     Results from last 7 days   Lab Units 04/03/23  2218   SODIUM mmol/L 142   POTASSIUM mmol/L 4.8   CO2 mmol/L 26.0   CREATININE mg/dL 1.24   GLUCOSE mg/dL 108*   MAGNESIUM mg/dL 1.9   PHOSPHORUS mg/dL 3.7     Estimated Creatinine Clearance: 83.3 mL/min (by C-G formula based on SCr of 1.24 mg/dL).  Results from last 7 days   Lab Units 04/03/23  2218   ALK PHOS U/L 81   BILIRUBIN mg/dL 0.7   ALT (SGPT) U/L 21   AST (SGOT) U/L 21     Results: Reviewed.    I reviewed the patient's new laboratory and imaging results.  I independently reviewed the patient's new images.    Medications: Reviewed.    Assessment & Plan   A / P     Active Hospital Problems    Diagnosis    • **Stenosis of right vertebral artery    • Atrial fibrillation, persistent    • Anemia    • History of GI bleed    • Vertebral artery stenosis      Patient Andreia is a 62-year-old male with history of atrial fibrillation status post pacemaker, diabetes (non-insulin-dependent), obstructive sleep apnea (on CPAP), obesity status post bypass surgery and remote tobacco abuse.  He was admitted overnight on 4/03 after presenting with acute onset dizziness, nausea, confusion and vomiting.  Imaging consistent with right vertebral artery stenosis.  Patient was treated with tPA and is currently being watched in the ICU.    -We will continue monitoring in the ICU for at least 24 hours status post tPA  -Monitor H/H and neuro exams per protocol  -Serial imaging per  neurology/stroke team.  MRI delayed until clearance given pacemaker; cardiology is consulted for this reason  -24 hours CTA head scheduled for 1900 on 4/04/2023  -ASA to be initiated if no hemorrhage on repeat CT scan  -Continue statin  -Echo pending  -Continue home CPAP for obstructive sleep apnea  -Patient has a history of atrial fibrillation but is not on anticoagulation at this time given prior GI bleed.  -Monitoring blood pressure closely with goal systolic less than 180 will use nicardipine drip as needed    F-Tube feeds per dietary recs  A-NA  S-NA  T-SCDs  H-Head of bed greater than 30 degrees  U-PPI  G-FSBS per unit protocol, correction dose insulin  S-NA  B-Will monitor daily and provide regimen if indicated  I-PIV  D-NA    Advance Directives:   Code Status and Medical Interventions:   Ordered at: 04/03/23 2017     Code Status (Patient has no pulse and is not breathing):    CPR (Attempt to Resuscitate)     Medical Interventions (Patient has pulse or is breathing):    Full Support     High level of risk due to severe exacerbation of chronic illness and illness with threat to life or bodily function.    I conducted multidisciplinary rounds in the plan of care was discussed with the multidisciplinary team at that time. In attendance at multidisciplinary rounds was clinical pharmacist, dietitian, nursing staff and case management.    I discussed the patient's findings and my recommendations with patient, family and nursing staff    -- Rico Barragan MD  Pulmonary/Critical Care

## 2023-04-04 NOTE — H&P
ICU ADMISSION NOTE    Chief complaint     Dizziness, nausea and vomiting  Right vertebral artery stenosis  Atrial fibrillation  Anemia    Subjective     Patient is a 62 y.o. male with hypertension, diet-controlled diabetes, previous gastric bypass, atrial fibrillation with permanent pacemaker, anemia, presenting with abrupt onset of dizziness nausea and vomiting.  Imaging revealed severe stenosis of his right vertebral artery.  At the outside hospital his NIH stroke scale was a 2 for some mild right arm and leg weakness.  tPA was started at 1930.  He currently reports that dizziness and nausea are significantly better.  He denies headache.  He denies any trouble with vision or speech.  At baseline he has difficulty walking because of arthritis in his knees.  He has a known history of atrial fibrillation with permanent pacemaker.  He reports that he has never been on anticoagulation.    Review of Systems  Review of Systems   Constitutional: Positive for activity change. Negative for fatigue.   HENT: Negative for congestion, sore throat, trouble swallowing and voice change.    Eyes: Negative for visual disturbance.   Respiratory: Positive for apnea and shortness of breath. Negative for cough.    Cardiovascular: Positive for leg swelling. Negative for chest pain and palpitations.   Gastrointestinal: Positive for nausea and vomiting. Negative for abdominal pain, blood in stool and diarrhea.   Endocrine: Negative.    Genitourinary: Negative for difficulty urinating.   Musculoskeletal: Positive for arthralgias.   Skin: Negative for rash.   Allergic/Immunologic: Negative for environmental allergies.   Neurological: Positive for dizziness. Negative for weakness and headaches.   Hematological: Does not bruise/bleed easily.   Psychiatric/Behavioral: Negative.         Home Medications  No medications prior to admission.   Allopurinol 300 mg daily  Norvasc 5-benazepril 20 mg daily  Gabapentin 300 mg 3 times  daily  Griseofulvin 500 mg daily  Antivert 12.5 mg 3 times daily as needed  Metoprolol  mg daily  Naproxen 500 mg  Nitroglycerin as needed  Omeprazole 40 mg daily  Zoloft 100 mg daily    History  Past Medical History:   Diagnosis Date   • Anemia    • Atrial fibrillation    • Bilateral arm fractures     Status post fall, treated with cast   • Diabetes    • History of permanent cardiac pacemaker placement    • Hypertension    • JUAN on CPAP    • Osteoarthritis     Bilateral knees     Past Surgical History:   Procedure Laterality Date   • CARDIAC CATHETERIZATION      [facility], patient states he did not have blockage   • CHOLECYSTECTOMY     • COLONOSCOPY      6 or 7 years ago   • GASTRIC BYPASS     • HAND TENDON SURGERY Right     Dupuytren's   • UMBILICAL HERNIA REPAIR       No family history on file.  Social History     Tobacco Use   • Smoking status: Former     Types: Cigarettes   • Tobacco comments:     Remote tobacco smoking only for a couple years quitting when he was 18 years of age   Vaping Use   • Vaping Use: Never used   Substance Use Topics   • Alcohol use: Never   • Drug use: Never     No medications prior to admission.     Allergies:  Bupropion and Sulfa antibiotics      Objective     Vital Signs  Blood pressure 144/93, pulse 64, temperature 98.7 °F (37.1 °C), temperature source Axillary, resp. rate 18, weight 120 kg (263 lb 10.7 oz), SpO2 96 %.    Physical Exam:  General Appearance:   Overweight middle-aged man supine in bed in no distress   Head:   Atraumatic   Eyes:          Pupils are equal and reactive to light.  Conjunctiva pink.  Extraocular movements intact   Ears:     Throat:  Oral mucosa dry, edentulous, no exudate   Neck:  Trachea midline, no palpable thyroid, no carotid bruits   Back:      Lungs:    Symmetric chest expansion without wheeze or rhonchi.    Heart:   Irregular rhythm, slow rate, S1, S2 auscultated, no murmur   Abdomen:    Bowel sounds are present, nondistended, soft    Rectal:     Deferred   Extremities:  No pretibial edema or cyanosis   Pulses:  Pedal pulses bounding   Skin:  Warm and dry without rash   Lymph nodes:  No cervical adenopathy   Neurologic:  Alert and oriented, speech is fluent, smile symmetric, tongue midline.  No motor drift upper or lower extremities.  Intact sensation       Results Review:   Lab Results (last 24 hours)     ** No results found for the last 24 hours. **      No laboratory data was sent from the outside hospital except a hemoglobin of 8.3, and an ABG that revealed a CO2 of 47 and an O2 of 56  Imaging Results (Last 24 Hours)     ** No results found for the last 24 hours. **           PROBLEM LIST    Severe stenosis right vertebral artery  Dizziness  Atrial fibrillation with permanent pacemaker  History of obstructive sleep apnea on CPAP      Assessment & Plan     62-year-old gentleman, remote smoker with no underlying lung disease, history of atrial fibrillation and pacemaker, history of diabetes currently diet controlled presenting with dizziness nausea and vomiting and found to have stenosis of the right vertebral artery.  He was treated with tPA.  His NIH stroke scale here is a 0.  He only smoked for a couple years quitting when he was 18.  He does not have underlying lung disease and does not use inhalers.  He does wear CPAP for sleep apnea.  He has previously gotten his cardiac care at [facility].  He has a history of atrial fibrillation and pacemaker.  He tells me he had a heart catheterization and they told him it was okay.  He denies ever having a heart attack and currently has no chest pain.  He was previously diagnosed with diabetes but he lost weight with his bypass surgery and his blood sugars were dropping low so he does not take any medication.    -Serial H&H's  -Type and screen  -Stool occult  -Monitor NIH stroke scale  -Repeat CT scan at 24 hours  -MRI, if pacemaker is compatible  -CPAP at night  -Aspirin, statin  -Proton  pump inhibitor  -Iron studies  -Echocardiogram with bubble  -Systolic blood pressure less than 185 but greater than 120  -Stroke neurology to assess  -Chest x-ray  -Gentle hydration  -Attempt to get pacemaker and heart cath records from [facility]     I discussed the patients findings and my recommendations with multidisciplinary team    Loni Qiu MD  04/03/23  21:33 EDT    Time: 55min    Please note that portions of this note were completed with a voice recognition program.

## 2023-04-05 VITALS
HEART RATE: 107 BPM | TEMPERATURE: 98.7 F | SYSTOLIC BLOOD PRESSURE: 165 MMHG | HEIGHT: 75 IN | RESPIRATION RATE: 18 BRPM | BODY MASS INDEX: 31.74 KG/M2 | DIASTOLIC BLOOD PRESSURE: 101 MMHG | OXYGEN SATURATION: 98 % | WEIGHT: 255.29 LBS

## 2023-04-05 LAB
ALBUMIN SERPL-MCNC: 4.5 G/DL (ref 3.5–5.2)
ALBUMIN/GLOB SERPL: 1.6 G/DL
ALP SERPL-CCNC: 109 U/L (ref 39–117)
ALT SERPL W P-5'-P-CCNC: 22 U/L (ref 1–41)
ANION GAP SERPL CALCULATED.3IONS-SCNC: 10 MMOL/L (ref 5–15)
AST SERPL-CCNC: 23 U/L (ref 1–40)
BILIRUB SERPL-MCNC: 1.4 MG/DL (ref 0–1.2)
BUN SERPL-MCNC: 10 MG/DL (ref 8–23)
BUN/CREAT SERPL: 10.6 (ref 7–25)
CALCIUM SPEC-SCNC: 9.1 MG/DL (ref 8.6–10.5)
CHLORIDE SERPL-SCNC: 108 MMOL/L (ref 98–107)
CO2 SERPL-SCNC: 24 MMOL/L (ref 22–29)
CREAT SERPL-MCNC: 0.94 MG/DL (ref 0.76–1.27)
DEPRECATED RDW RBC AUTO: 44.2 FL (ref 37–54)
EGFRCR SERPLBLD CKD-EPI 2021: 90.5 ML/MIN/1.73
ERYTHROCYTE [DISTWIDTH] IN BLOOD BY AUTOMATED COUNT: 16.5 % (ref 12.3–15.4)
FOLATE SERPL-MCNC: >20 NG/ML (ref 4.78–24.2)
GLOBULIN UR ELPH-MCNC: 2.9 GM/DL
GLUCOSE BLDC GLUCOMTR-MCNC: 103 MG/DL (ref 70–130)
GLUCOSE BLDC GLUCOMTR-MCNC: 107 MG/DL (ref 70–130)
GLUCOSE BLDC GLUCOMTR-MCNC: 98 MG/DL (ref 70–130)
GLUCOSE SERPL-MCNC: 104 MG/DL (ref 65–99)
HCT VFR BLD AUTO: 36.6 % (ref 37.5–51)
HGB BLD-MCNC: 10.1 G/DL (ref 13–17.7)
MCH RBC QN AUTO: 20.8 PG (ref 26.6–33)
MCHC RBC AUTO-ENTMCNC: 27.6 G/DL (ref 31.5–35.7)
MCV RBC AUTO: 75.3 FL (ref 79–97)
PLATELET # BLD AUTO: 256 10*3/MM3 (ref 140–450)
PMV BLD AUTO: 10.3 FL (ref 6–12)
POTASSIUM SERPL-SCNC: 4.1 MMOL/L (ref 3.5–5.2)
PROT SERPL-MCNC: 7.4 G/DL (ref 6–8.5)
RBC # BLD AUTO: 4.86 10*6/MM3 (ref 4.14–5.8)
RETICS # AUTO: 0.06 10*6/MM3 (ref 0.02–0.13)
RETICS/RBC NFR AUTO: 1.26 % (ref 0.7–1.9)
SODIUM SERPL-SCNC: 142 MMOL/L (ref 136–145)
VIT B12 BLD-MCNC: 1141 PG/ML (ref 211–946)
WBC NRBC COR # BLD: 7.48 10*3/MM3 (ref 3.4–10.8)

## 2023-04-05 PROCEDURE — 97165 OT EVAL LOW COMPLEX 30 MIN: CPT

## 2023-04-05 PROCEDURE — 97161 PT EVAL LOW COMPLEX 20 MIN: CPT

## 2023-04-05 PROCEDURE — 99238 HOSP IP/OBS DSCHRG MGMT 30/<: CPT | Performed by: INTERNAL MEDICINE

## 2023-04-05 PROCEDURE — 99232 SBSQ HOSP IP/OBS MODERATE 35: CPT | Performed by: PSYCHIATRY & NEUROLOGY

## 2023-04-05 PROCEDURE — 82746 ASSAY OF FOLIC ACID SERUM: CPT | Performed by: INTERNAL MEDICINE

## 2023-04-05 PROCEDURE — 85045 AUTOMATED RETICULOCYTE COUNT: CPT | Performed by: INTERNAL MEDICINE

## 2023-04-05 PROCEDURE — 85027 COMPLETE CBC AUTOMATED: CPT | Performed by: INTERNAL MEDICINE

## 2023-04-05 PROCEDURE — 80053 COMPREHEN METABOLIC PANEL: CPT | Performed by: INTERNAL MEDICINE

## 2023-04-05 PROCEDURE — 99222 1ST HOSP IP/OBS MODERATE 55: CPT | Performed by: PHYSICIAN ASSISTANT

## 2023-04-05 PROCEDURE — 82962 GLUCOSE BLOOD TEST: CPT

## 2023-04-05 PROCEDURE — 82607 VITAMIN B-12: CPT | Performed by: INTERNAL MEDICINE

## 2023-04-05 RX ORDER — ASPIRIN 81 MG/1
81 TABLET ORAL DAILY
Qty: 30 TABLET | Refills: 3 | Status: SHIPPED | OUTPATIENT
Start: 2023-04-05

## 2023-04-05 RX ORDER — CHOLECALCIFEROL (VITAMIN D3) 125 MCG
5 CAPSULE ORAL NIGHTLY PRN
Status: DISCONTINUED | OUTPATIENT
Start: 2023-04-05 | End: 2023-04-05 | Stop reason: HOSPADM

## 2023-04-05 RX ORDER — ATORVASTATIN CALCIUM 80 MG/1
80 TABLET, FILM COATED ORAL NIGHTLY
Qty: 90 TABLET | Refills: 1 | Status: SHIPPED | OUTPATIENT
Start: 2023-04-05

## 2023-04-05 RX ADMIN — PANTOPRAZOLE SODIUM 40 MG: 40 INJECTION, POWDER, LYOPHILIZED, FOR SOLUTION INTRAVENOUS at 05:01

## 2023-04-05 RX ADMIN — AMLODIPINE BESYLATE 10 MG: 10 TABLET ORAL at 10:19

## 2023-04-05 RX ADMIN — Medication 5 MG: at 00:19

## 2023-04-05 NOTE — THERAPY DISCHARGE NOTE
Patient Name: Taran Boswell  : 1960    MRN: [id number]                              Today's Date: 2023       Admit Date: 4/3/2023    Visit Dx:     ICD-10-CM ICD-9-CM   1. Examination for normal comparison for clinical research  Z00.6 V70.7     Patient Active Problem List   Diagnosis   • Stenosis of right vertebral artery   • Atrial fibrillation, persistent   • Anemia   • History of GI bleed   • Vertebral artery stenosis     Past Medical History:   Diagnosis Date   • Anemia    • Atrial fibrillation    • Bilateral arm fractures     Status post fall, treated with cast   • Diabetes    • History of permanent cardiac pacemaker placement    • Hypertension    • JUAN on CPAP    • Osteoarthritis     Bilateral knees     Past Surgical History:   Procedure Laterality Date   • CARDIAC CATHETERIZATION      [facility], patient states he did not have blockage   • CHOLECYSTECTOMY     • COLONOSCOPY      6 or 7 years ago   • GASTRIC BYPASS     • HAND TENDON SURGERY Right     Dupuytren's   • UMBILICAL HERNIA REPAIR        General Information     Row Name 23 1135          Physical Therapy Time and Intention    Document Type discharge evaluation/summary  -AY     Mode of Treatment physical therapy  -AY     Row Name 23 1135          General Information    Patient Profile Reviewed yes  -AY     Prior Level of Function independent:;all household mobility;community mobility;gait;transfer;bed mobility;using stairs  -AY     Existing Precautions/Restrictions no known precautions/restrictions  -AY     Barriers to Rehab none identified  -AY     Row Name 23 1135          Living Environment    People in Home spouse  -AY     Row Name 23 1135          Home Main Entrance    Number of Stairs, Main Entrance four  -AY     Stair Railings, Main Entrance railings on both sides of stairs  -AY     Row Name 23 1135          Stairs Within Home, Primary    Number of Stairs, Within Home, Primary none  -AY     Row Name  04/05/23 1135          Cognition    Orientation Status (Cognition) oriented x 4  -AY           User Key  (r) = Recorded By, (t) = Taken By, (c) = Cosigned By    Initials Name Provider Type    Kelly Andre PT Physical Therapist               Mobility     Row Name 04/05/23 1137          Sit-Stand Transfer    Sit-Stand Cole (Transfers) independent  -AY     Row Name 04/05/23 1137          Gait/Stairs (Locomotion)    Cole Level (Gait) independent  -AY     Distance in Feet (Gait) 500  -AY     Cole Level (Stairs) modified independence  -AY     Handrail Location (Stairs) right side (ascending);left side (descending)  -AY     Number of Steps (Stairs) 6  -AY     Ascending Technique (Stairs) step-over-step  -AY     Descending Technique (Stairs) step-over-step  -AY     Comment, (Gait/Stairs) pt demonstrated step through gait pattern with no LOB noted. HR increased to 158, but returned to WFL with seated rest. No instability noted with stair navigation.  -AY           User Key  (r) = Recorded By, (t) = Taken By, (c) = Cosigned By    Initials Name Provider Type    AY Kelly Johansen PT Physical Therapist               Obj/Interventions     Row Name 04/05/23 1139          Range of Motion Comprehensive    General Range of Motion bilateral lower extremity ROM WFL  -AY     Row Name 04/05/23 1139          Strength Comprehensive (MMT)    General Manual Muscle Testing (MMT) Assessment no strength deficits identified  -AY     Comment, General Manual Muscle Testing (MMT) Assessment BLE grossly 5/5  -AY     Row Name 04/05/23 1139          Motor Skills    Motor Skills coordination  -AY     Coordination WFL;bilateral;lower extremity;heel to lewis  -AY     Row Name 04/05/23 1139          Balance    Balance Assessment sitting static balance;sitting dynamic balance;sit to stand dynamic balance;standing static balance;standing dynamic balance  -AY     Static Sitting Balance independent  -AY     Dynamic Sitting Balance  independent  -AY     Position, Sitting Balance unsupported;sitting edge of bed  -AY     Sit to Stand Dynamic Balance independent  -AY     Static Standing Balance independent  -AY     Dynamic Standing Balance independent  -AY     Position/Device Used, Standing Balance supported  -AY     Row Name 04/05/23 1139          Sensory Assessment (Somatosensory)    Sensory Assessment (Somatosensory) LE sensation intact  -AY           User Key  (r) = Recorded By, (t) = Taken By, (c) = Cosigned By    Initials Name Provider Type    AY Kelly Johansen, PT Physical Therapist               Goals/Plan    No documentation.                Clinical Impression     Row Name 04/05/23 1140          Pain    Pretreatment Pain Rating 0/10 - no pain  -AY     Posttreatment Pain Rating 0/10 - no pain  -AY     Pain Intervention(s) Repositioned;Ambulation/increased activity  -AY     Additional Documentation Pain Scale: Numbers Pre/Post-Treatment (Group)  -AY     Row Name 04/05/23 1140          Plan of Care Review    Plan of Care Reviewed With patient  -AY     Progress improving  -AY     Outcome Evaluation PT initial eval completed. Pt with no deficits requiring skilled IP PT at this time. Pt ambulated 500ft indep and navigated 6 steps with unilateral handrail. Will d/c PT orders at this time. d/c rec for home with assist.  -AY     Row Name 04/05/23 1140          Therapy Assessment/Plan (PT)    Rehab Potential (PT) good, to achieve stated therapy goals  -AY     Criteria for Skilled Interventions Met (PT) yes;meets criteria;skilled treatment is necessary  -AY     Therapy Frequency (PT) daily  -AY     Row Name 04/05/23 1140          Vital Signs    Pre Systolic BP Rehab 128  -AY     Pre Treatment Diastolic BP 91  -AY     Post Systolic BP Rehab 130  -AY     Post Treatment Diastolic BP 68  -AY     Pretreatment Heart Rate (beats/min) 85  -AY     Intratreatment Heart Rate (beats/min) 155  -AY     Posttreatment Heart Rate (beats/min) 89  -AY     Pre SpO2 (%)  100  -AY     O2 Delivery Pre Treatment room air  -AY     O2 Delivery Intra Treatment room air  -AY     Post SpO2 (%) 99  -AY     O2 Delivery Post Treatment room air  -AY     Pre Patient Position Sitting  -AY     Intra Patient Position Standing  -AY     Post Patient Position Sitting  -AY     Row Name 04/05/23 1140          Positioning and Restraints    Pre-Treatment Position sitting in chair/recliner  -AY     In Chair notified nsg;reclined;sitting;call light within reach;encouraged to call for assist;exit alarm on;with family/caregiver;legs elevated;waffle cushion  -AY           User Key  (r) = Recorded By, (t) = Taken By, (c) = Cosigned By    Initials Name Provider Type    AY Kelly Johansen, PT Physical Therapist               Outcome Measures     Row Name 04/05/23 1144          How much help from another person do you currently need...    Turning from your back to your side while in flat bed without using bedrails? 4  -AY     Moving from lying on back to sitting on the side of a flat bed without bedrails? 4  -AY     Moving to and from a bed to a chair (including a wheelchair)? 4  -AY     Standing up from a chair using your arms (e.g., wheelchair, bedside chair)? 4  -AY     Climbing 3-5 steps with a railing? 4  -AY     To walk in hospital room? 4  -AY     AM-PAC 6 Clicks Score (PT) 24  -AY     Highest level of mobility 8 --> Walked 250 feet or more  -AY     Row Name 04/05/23 1144 04/05/23 1052       Modified [place] Scale    Pre-Stroke Modified [place] Scale 0 - No Symptoms at all.  -AY 0 - No Symptoms at all.  -CS    Modified [place] Scale 0 - No Symptoms at all.  -AY 0 - No Symptoms at all.  -CS    Row Name 04/05/23 1144 04/05/23 1052       Functional Assessment    Outcome Measure Options AM-PAC 6 Clicks Basic Mobility (PT);Modified [place]  -AY AM-PAC 6 Clicks Daily Activity (OT);Modified [place]  -CS          User Key  (r) = Recorded By, (t) = Taken By, (c) = Cosigned By    Initials Name Provider Type    NADER Roque  Hue, OT Occupational Therapist    AY Kelly Johansen PT Physical Therapist              Physical Therapy Education     Title: PT OT SLP Therapies (In Progress)     Topic: Physical Therapy (Done)     Point: Mobility training (Done)     Learning Progress Summary           Patient Acceptance, E,TB, VU,NR by AY at 4/5/2023 1144                   Point: Home exercise program (Done)     Learning Progress Summary           Patient Acceptance, E,TB, VU,NR by AY at 4/5/2023 1144                   Point: Body mechanics (Done)     Learning Progress Summary           Patient Acceptance, E,TB, VU,NR by AY at 4/5/2023 1144                   Point: Precautions (Done)     Learning Progress Summary           Patient Acceptance, E,TB, VU,NR by AY at 4/5/2023 1144                               User Key     Initials Effective Dates Name Provider Type Discipline    AY 11/10/20 -  Kelly Johansen PT Physical Therapist PT              PT Recommendation and Plan     Plan of Care Reviewed With: patient  Progress: improving  Outcome Evaluation: PT initial eval completed. Pt with no deficits requiring skilled IP PT at this time. Pt ambulated 500ft indep and navigated 6 steps with unilateral handrail. Will d/c PT orders at this time. d/c rec for home with assist.     Time Calculation:    PT Charges     Row Name 04/05/23 1145             Time Calculation    Start Time 0940  -AY      PT Received On 04/05/23  -AY      PT Goal Re-Cert Due Date 04/15/23  -AY         Untimed Charges    PT Eval/Re-eval Minutes 46  -AY         Total Minutes    Untimed Charges Total Minutes 46  -AY       Total Minutes 46  -AY            User Key  (r) = Recorded By, (t) = Taken By, (c) = Cosigned By    Initials Name Provider Type    AY Kelly Johansen PT Physical Therapist              Therapy Charges for Today     Code Description Service Date Service Provider Modifiers Qty    85920881141 HC PT EVAL LOW COMPLEXITY 4 4/5/2023 Kelly Johansen, INGRID GP 1          PT  G-Codes  Outcome Measure Options: AM-PAC 6 Clicks Basic Mobility (PT), Modified [place]  AM-PAC 6 Clicks Score (PT): 24  AM-PAC 6 Clicks Score (OT): 24  Modified [place] Scale: 0 - No Symptoms at all.    PT Discharge Summary  Anticipated Discharge Disposition (PT): home with assist  Reason for Discharge: Independent, At baseline function  Outcomes Achieved: Refer to plan of care for updates on goals achieved    Kelly Johansen, PT  4/5/2023

## 2023-04-05 NOTE — CONSULTS
[facility] Consult    Referring Provider: Loni Qiu, *    PCP: Connor Richard MD    Reason for Consultation: Microcytic anemia    History of present illness:    Taran Boswell is a 62 y.o. male, PMH includes Afib, s/p PPM placement, HTN, JUAN on CPAP, vertebral artery stenosis, is admitted via ED yesterday for evaluation of acute ischemic stroke s/p tPA.     At time of admission, labs significant for Hb 8.2, Hct 29.9, MCV 77.1, MCH 21.1, plt 240, total Fe 14, iron saturation 3%, transferrin 362, TIBC 539.     Pt does report chronic h/o NELLIE, takes ferrous sulfate 325mg, omeprazole 40mg daily at home long term. He is unsure his baseline H/H, but states that he follows closely with PCP and GI provider at [place] [facility] for such. Patient denies associated fever, chills, abdominal pain, indigestion, nausea, vomiting, diarrhea, constipation, hematemesis, dysphagia, hematochezia, melena, bloating, weight loss or gain, dysuria, jaundice or bruising. He generally has a large, formed, medium brown BM daily. EGD / CSY within the last few months at [place] [facility], pt states reportedly no acute findings. Pt reports h/o upper GI bleed about one year ago following initiation of NOAC for Afib. He is not currently anticoagulated.     Patient denies personal or FHx of pancreatitis, colitis, Celiac disease, UC, Crohn's disease, IBS, colon or gastric cancers. Pt denies EtOH, tobacco, illicit substance or NSAID use.    Allergies:  Bupropion and Sulfa antibiotics    Scheduled Meds:  amLODIPine, 10 mg, Oral, Q24H  aspirin, 325 mg, Oral, Daily   Or  aspirin, 300 mg, Rectal, Daily  atorvastatin, 80 mg, Oral, Nightly  insulin lispro, 0-7 Units, Subcutaneous, TID AC  pantoprazole, 40 mg, Intravenous, Q AM  sodium chloride, 10 mL, Intravenous, Q12H         Infusions:       PRN Meds:  •  dextrose  •  dextrose  •  glucagon (human recombinant)  •  melatonin  •  metoprolol tartrate  •  ondansetron  •  sodium  chloride  •  sodium chloride    Home Meds:  Medications Prior to Admission   Medication Sig Dispense Refill Last Dose   • allopurinol (ZYLOPRIM) 300 MG tablet Take 1 tablet by mouth Daily.      • amLODIPine-benazepril (LOTREL 5-20) 5-20 MG per capsule Take 1 capsule by mouth Daily.      • metoprolol succinate XL (TOPROL-XL) 100 MG 24 hr tablet Take 1 tablet by mouth Daily.      • naproxen (NAPROSYN) 500 MG tablet Take 1 tablet by mouth 2 (Two) Times a Day As Needed for Mild Pain.      • omeprazole (priLOSEC) 40 MG capsule Take 1 capsule by mouth Daily.      • sertraline (ZOLOFT) 100 MG tablet Take 1 tablet by mouth Daily.          ROS: Review of Systems   Constitutional: Negative for chills, diaphoresis, fatigue and unexpected weight change.   HENT: Negative for drooling, facial swelling, nosebleeds, rhinorrhea, sore throat, tinnitus and trouble swallowing.    Respiratory: Negative for cough, chest tightness and shortness of breath.    Cardiovascular: Negative for chest pain and palpitations.   Gastrointestinal: Positive for nausea (resolved). Negative for abdominal pain, blood in stool, constipation, diarrhea and vomiting.   Genitourinary: Negative for dysuria, flank pain and hematuria.   Musculoskeletal: Negative for arthralgias, myalgias and neck pain.   Skin: Negative for color change and pallor.   Neurological: Positive for dizziness (resolved) and weakness (resolved). Negative for tremors, syncope, numbness and headaches.   Hematological: Does not bruise/bleed easily.   Psychiatric/Behavioral: Negative for confusion and hallucinations. The patient is not nervous/anxious.    All other systems reviewed and are negative.      PAST MED HX:  Past Medical History:   Diagnosis Date   • Anemia    • Atrial fibrillation    • Bilateral arm fractures     Status post fall, treated with cast   • Diabetes    • History of permanent cardiac pacemaker placement    • Hypertension    • JUAN on CPAP    • Osteoarthritis      "Bilateral knees       PAST SURG HX:  Past Surgical History:   Procedure Laterality Date   • CARDIAC CATHETERIZATION      [facility], patient states he did not have blockage   • CHOLECYSTECTOMY     • COLONOSCOPY      6 or 7 years ago   • GASTRIC BYPASS     • HAND TENDON SURGERY Right     Dupuytren's   • UMBILICAL HERNIA REPAIR         FAM HX:  No family history on file.    SOC HX:  Social History     Socioeconomic History   • Marital status:    • Number of children: 2   Tobacco Use   • Smoking status: Former     Types: Cigarettes   • Tobacco comments:     Remote tobacco smoking only for a couple years quitting when he was 18 years of age   Vaping Use   • Vaping Use: Never used   Substance and Sexual Activity   • Alcohol use: Never   • Drug use: Never       PHYSICAL EXAM  /79   Pulse 72   Temp 97.9 °F (36.6 °C) (Oral)   Resp 18   Ht 190.5 cm (75\")   Wt 116 kg (255 lb 4.7 oz)   SpO2 99%   BMI 31.91 kg/m²   Wt Readings from Last 3 Encounters:   04/05/23 116 kg (255 lb 4.7 oz)   ,body mass index is 31.91 kg/m².  Physical Exam  Vitals and nursing note reviewed.   Constitutional:       General: He is not in acute distress.     Appearance: Normal appearance. He is not ill-appearing or diaphoretic.      Comments: Pleasantly conversant. Appears older than stated age.    HENT:      Head: Normocephalic and atraumatic.      Right Ear: External ear normal.      Left Ear: External ear normal.      Nose: Nose normal.      Mouth/Throat:      Mouth: Mucous membranes are moist.      Pharynx: Oropharynx is clear.      Comments: Edentelous  Eyes:      Conjunctiva/sclera: Conjunctivae normal.      Pupils: Pupils are equal, round, and reactive to light.   Cardiovascular:      Rate and Rhythm: Normal rate and regular rhythm.      Pulses: Normal pulses.      Heart sounds: Normal heart sounds.   Pulmonary:      Effort: Pulmonary effort is normal.      Breath sounds: Normal breath sounds.   Abdominal:      General: " Abdomen is flat. There is no distension.      Tenderness: There is no abdominal tenderness. There is no guarding or rebound.   Genitourinary:     Comments: Pt deferred  Musculoskeletal:         General: Normal range of motion.      Cervical back: Normal range of motion and neck supple.   Skin:     General: Skin is warm and dry.   Neurological:      General: No focal deficit present.      Mental Status: He is alert and oriented to person, place, and time.   Psychiatric:         Mood and Affect: Mood normal.         Results Review:   I reviewed the patient's new clinical results.  I reviewed the patient's new imaging results and agree with the interpretation.  I reviewed the patient's other test results and agree with the interpretation    Lab Results   Component Value Date    WBC 5.94 04/03/2023    HGB 8.2 (L) 04/03/2023    HCT 29.9 (L) 04/03/2023    MCV 77.1 (L) 04/03/2023     04/03/2023       No results found for: INR    Lab Results   Component Value Date    GLUCOSE 108 (H) 04/03/2023    BUN 16 04/03/2023    CREATININE 1.24 04/03/2023    BCR 12.9 04/03/2023     04/03/2023    K 4.8 04/03/2023    CO2 26.0 04/03/2023    CALCIUM 8.7 04/03/2023    ALBUMIN 3.9 04/03/2023    ALKPHOS 81 04/03/2023    BILITOT 0.7 04/03/2023    ALT 21 04/03/2023    AST 21 04/03/2023       ASSESSMENTS/PLANS    Chronic microcytic hypochromic anemia  Iron deficiency anemia  H/o GI bleed on oral anticoagulation  H/o gastric bypass  Stenosis of right vertebral artery  PAF   - continue trending H/H and transfuse per hospitalist protocols   - continue omeprazole 40mg daily    - pt states that he is anticipating discharge later today and does not wish to proceed with endoscopic evaluation; he is established with GI provider at [facility] and plans to follow up with them as scheduled    I discussed the patient's findings and my recommendations with patient. Thank you very kindly for this consultation. Pt is stable from a GI  standpoint. Continue oral diet. Recommend continuing meds as above. Please call with questions.         Staci Gutiérrez PA-C  04/05/23  09:04 EDT

## 2023-04-05 NOTE — DISCHARGE SUMMARY
Discharge Summary    Patient name: Taran Boswell  CSN: [id number]  MRN: [id number]  : 1960  Today's date: 2023     Date of Admission: 4/3/2023  Date of Discharge:  2023     Admitting Physician: Loni Qiu MD  Primary Care Provider: Connor Richard MD  Consultations:   - Neurology ([facility])  - Gastroenterology ([facility])    Admission Diagnosis: Stroke      Discharge Diagnoses:   Active Hospital Problems    Diagnosis    • **Stenosis of right vertebral artery    • Atrial fibrillation, persistent    • Anemia    • History of GI bleed    • Vertebral artery stenosis      Allergies:  Bupropion and Sulfa antibiotics    Code Status and Medical Interventions:   Ordered at: 23     Code Status (Patient has no pulse and is not breathing):    CPR (Attempt to Resuscitate)     Medical Interventions (Patient has pulse or is breathing):    Full Support     Procedures/Testing:     CTH ():  Radiology Impression:   White matter changes involving the cerebral hemispheres which could reflect more chronic small vessel ischemic change.      ECHO ():   •  Left ventricular systolic function is normal. Left ventricular ejection fraction appears to be 51 - 55%.  •  Left atrial volume is severely increased.  •  Saline test results are negative.  •  Mild mitral valve regurgitation is present  •  Mild tricuspid valve regurgitation is present.  •  Estimated right ventricular systolic pressure from tricuspid regurgitation is moderately elevated (45-55 mmHg).    History of Present Illness:  Patient is a 62 y.o. male with hypertension, diet-controlled diabetes, previous gastric bypass, atrial fibrillation with permanent pacemaker, anemia, presenting with abrupt onset of dizziness nausea and vomiting.  Imaging revealed severe stenosis of his right vertebral artery.  At the outside hospital his NIH stroke scale was a 2 for some mild right arm and leg weakness.  tPA was started at 1930.  He currently  "reports that dizziness and nausea are significantly better.  He denies headache.  He denies any trouble with vision or speech.  At baseline he has difficulty walking because of arthritis in his knees.  He has a known history of atrial fibrillation with permanent pacemaker.  He reports that he has never been on anticoagulation.    Hospital Course:  Patient was admitted overnight on 4/03 after presenting as transfer with acute onset dizziness, nausea, confusion and vomiting.  Initial imaging consistent with right vertebral arterial stenosis.  Patient was treated with tPA and was monitored in the ICU for any associated complications.  Monitored H/H and neurological status very closely.  Serial imaging was examined per neurology/stroke team.  Ideally an MRI would be needed under admission for CVA; however, this was unable to be performed given patient's pacemaker.  24 hour post tPA CT head performed on 4/04 with white matter changes/chronic small vessel ischemic changes.  Imaging reviewed by neurology who ultimately thought it looked unchanged and cleared patient for home discharge.  On day of discharge patient was hemodynamically stable, mentating at baseline, tolerating oral feeds and ambulating without assistance.  Patient with known history of anemia and questionable history of prior GI bleed.  Neurology had strongly considered placing patient on antiplatelet versus anticoagulation given aforementioned stenosis and spoke with GI considering clearance.  Due to anemia // bleeding risk neurology ultimately decided to hold off but instructed patient to take a baby aspirin daily.  GI was able to evaluate patient clinically during hospitalization. Patient was not interested in upper/lower endoscopy to look for signs of active bleeding.  Hemoglobin/hematocrit was 8.2/29.9 on admission and 10.1/36.6 on day of discharge    Vitals:  BP (!) 165/101   Pulse 107   Temp 98.7 °F (37.1 °C) (Oral)   Resp 18   Ht 190.5 cm (75\")  "  Wt 116 kg (255 lb 4.7 oz)   SpO2 98%   BMI 31.91 kg/m²     Physical Exam:  General: The patient appears in no acute distress. Alert, cooperative and interactive.  ENT/Neck: Trachea midline, No masses.   Chest: Clear to auscultation bilaterally, No wheezing, rhonchi, or rales. Normal work of breathing. Equal chest rise.  Cardiac: Regular rhythm, normal rate, S1S2 auscultated. No murmurs, rubs or gallops.   Abdomen: Soft, non-tender, non-distended, positive bowel sounds in all four quadrants.   Extremities: No lower extremity edema. No clubbing or cyanosis.  Skin: No rashes, open wounds, or bruising. Warm, dry, well-perfused.  Neuro: Motor power grossly intact bilaterally. Sensation intact. Speech fluid and fluent. Thought process coherent.  Psych: Alert and oriented x3. Mood stable.    Labs:  Results from last 7 days   Lab Units 04/05/23  1148   WBC 10*3/mm3 7.48   HEMOGLOBIN g/dL 10.1*   HEMATOCRIT % 36.6*   PLATELETS 10*3/mm3 256     Results from last 7 days   Lab Units 04/05/23  1148   SODIUM mmol/L 142   POTASSIUM mmol/L 4.1   CHLORIDE mmol/L 108*   CO2 mmol/L 24.0   BUN mg/dL 10   CREATININE mg/dL 0.94   CALCIUM mg/dL 9.1   BILIRUBIN mg/dL 1.4*   ALK PHOS U/L 109   ALT (SGPT) U/L 22   AST (SGOT) U/L 23   GLUCOSE mg/dL 104*         Magnesium   Date Value Ref Range Status   04/03/2023 1.9 1.6 - 2.4 mg/dL Final     Phosphorus   Date Value Ref Range Status   04/03/2023 3.7 2.5 - 4.5 mg/dL Final                    Discharge Medications      ASK your doctor about these medications      Instructions Start Date   allopurinol 300 MG tablet  Commonly known as: ZYLOPRIM   300 mg, Oral, Daily      amLODIPine-benazepril 5-20 MG per capsule  Commonly known as: LOTREL 5-20   1 capsule, Oral, Daily      metoprolol succinate  MG 24 hr tablet  Commonly known as: TOPROL-XL   100 mg, Oral, Daily      naproxen 500 MG tablet  Commonly known as: NAPROSYN   500 mg, Oral, 2 Times Daily PRN      omeprazole 40 MG  capsule  Commonly known as: priLOSEC   40 mg, Oral, Daily      sertraline 100 MG tablet  Commonly known as: ZOLOFT   100 mg, Oral, Daily               Follow-up Appointments // Discharge Instructions:  - Follow-up with GI (as scheduled)   - Follow-up with Neurology in 2 months    -- Rico Barragan MD  Pulmonary/Critical Care    Time: I spent  <30  minutes on this discharge activity which included: face-to-face encounter with the patient, reviewing the data in the system, coordination of the care with the nursing staff as well as consultants, documentation, and entering orders.      CC: Connor Richard MD

## 2023-04-05 NOTE — PROGRESS NOTES
"INTENSIVIST   PROGRESS NOTE     Hospital:  LOS: 2 days     Chief Complaint: Stroke     Subjective   S     Interval History: No acute issues overnight.  Patient mentating at baseline.  No chest pain, dyspnea, syncope, presyncope, headache, nausea, vomiting, diarrhea.    The patient's relevant past medical, surgical and social history were reviewed and updated in Epic as appropriate.      ROS: Fourteen point review of system performed and negative except for that mentioned in HPI.     Objective   O     Intake/Ouptut 24 hrs (7:00AM - 6:59 AM)  Intake & Output (last 3 days)       04/02 0701 04/03 0700 04/03 0701 04/04 0700 04/04 0701 04/05 0700 04/05 0701 04/06 0700    P.O.   1300 240    I.V. (mL/kg)  418.8 (3.5) 685 (5.9)     Total Intake(mL/kg)  418.8 (3.5) 1985 (17.1) 240 (2.1)    Urine (mL/kg/hr)  1950 5025 (1.8) 450 (0.8)    Total Output  1950 5025 450    Net  -1531.3 -3040 -210                Medications (drips):   Respiratory Support: Room air      Physical Examination:  Vital Signs: Blood pressure 142/79, pulse 72, temperature 97.9 °F (36.6 °C), temperature source Oral, resp. rate 18, height 190.5 cm (75\"), weight 116 kg (255 lb 4.7 oz), SpO2 99 %.    General: The patient appears in no acute distress. Alert, cooperative and interactive.  ENT/Neck: Trachea midline, No masses.   Chest: Clear to auscultation bilaterally, No wheezing, rhonchi, or rales. Normal work of breathing. Equal chest rise.  Cardiac: Regular rhythm, normal rate, S1S2 auscultated. No murmurs, rubs or gallops.   Abdomen: Soft, non-tender, non-distended, positive bowel sounds in all four quadrants.   Extremities: No lower extremity edema. No clubbing or cyanosis.  Skin: No rashes, open wounds, or bruising. Warm, dry, well-perfused.  Neuro: Motor power grossly intact bilaterally. Sensation intact. Speech fluid and fluent. Thought process coherent.  Psych: Alert and oriented x3. Mood stable.    Lines, Drains & Airways     Active LDAs     Name " Placement date Placement time Site Days    Peripheral IV 04/03/23 2108 Left Antecubital 04/03/23 2108  Antecubital  less than 1    Peripheral IV 04/03/23 Right Antecubital 04/03/23  --  Antecubital  1              Results from last 7 days   Lab Units 04/03/23  2153   WBC 10*3/mm3 5.94   HEMOGLOBIN g/dL 8.2*   MCV fL 77.1*   PLATELETS 10*3/mm3 240     Results from last 7 days   Lab Units 04/03/23  2218   SODIUM mmol/L 142   POTASSIUM mmol/L 4.8   CO2 mmol/L 26.0   CREATININE mg/dL 1.24   GLUCOSE mg/dL 108*   MAGNESIUM mg/dL 1.9   PHOSPHORUS mg/dL 3.7     Estimated Creatinine Clearance: 82.7 mL/min (by C-G formula based on SCr of 1.24 mg/dL).  Results from last 7 days   Lab Units 04/03/23  2218   ALK PHOS U/L 81   BILIRUBIN mg/dL 0.7   ALT (SGPT) U/L 21   AST (SGOT) U/L 21     CTH (4/04):  Radiology Impression:   White matter changes involving the cerebral hemispheres which could reflect more chronic small vessel ischemic change.     ECHO (4/04):   •  Left ventricular systolic function is normal. Left ventricular ejection fraction appears to be 51 - 55%.  •  Left atrial volume is severely increased.  •  Saline test results are negative.  •  Mild mitral valve regurgitation is present  •  Mild tricuspid valve regurgitation is present.  •  Estimated right ventricular systolic pressure from tricuspid regurgitation is moderately elevated (45-55 mmHg).    Results: Reviewed.    I reviewed the patient's new laboratory and imaging results.  I independently reviewed the patient's new images.    Medications: Reviewed.    Assessment & Plan   A / P     Active Hospital Problems    Diagnosis    • **Stenosis of right vertebral artery    • Atrial fibrillation, persistent    • Anemia    • History of GI bleed    • Vertebral artery stenosis      Patient Andreia is a 62-year-old male with history of atrial fibrillation status post pacemaker, diabetes (non-insulin-dependent), obstructive sleep apnea (on CPAP), obesity status post bypass  surgery and remote tobacco abuse.  He was admitted overnight on 4/03 after presenting with acute onset dizziness, nausea, confusion and vomiting.  Imaging consistent with right vertebral artery stenosis.  Patient was treated with tPA and is currently being watched in the ICU.    -Monitored in the ICU 24 hours post tPA  -Monitor H/H and neuro exams per protocol  -Serial imaging per neurology/stroke team.  Unable to perform MRI given pacemaker  -24 hours CTA head scheduled for 1900 on 4/04/2023  -Continue statin and ASA  -ECHO performed, see results above   -Continue home CPAP for obstructive sleep apnea  -Patient has a history of atrial fibrillation but is not on anticoagulation at this time given prior GI bleed.  On history this morning (4/05) patient notes that this bleed was suspected but upper/lower GI normal previously.  Has known anemia.  Neurology considering antiplatelet versus anticoagulation pending GI evaluation/clearance.  Repeat CBC pending.  Fecal occult pending  -Likely can be discharged today pending final neuro plan and GI assessment.  Regardless patient will likely need follow-up with PCP for clinic evaluation and repeat H/H in 1 to 2 weeks after initiating therapy.  Gave return precautions in the setting of syncope, dizziness, BRBPR, hemoptysis and melanic stools    F-Tube feeds per dietary recs  A-NA  S-NA  T-SCDs  H-Head of bed greater than 30 degrees  U-PPI  G-FSBS per unit protocol, correction dose insulin  S-NA  B-Will monitor daily and provide regimen if indicated  I-PIV  D-NA    Advance Directives:   Code Status and Medical Interventions:   Ordered at: 04/03/23 2017     Code Status (Patient has no pulse and is not breathing):    CPR (Attempt to Resuscitate)     Medical Interventions (Patient has pulse or is breathing):    Full Support     High level of risk due to severe exacerbation of chronic illness and illness with threat to life or bodily function.    I conducted multidisciplinary  rounds in the plan of care was discussed with the multidisciplinary team at that time. In attendance at multidisciplinary rounds was clinical pharmacist, dietitian, nursing staff and case management.    I discussed the patient's findings and my recommendations with patient, family and nursing staff    -- Rico Barragan MD  Pulmonary/Critical Care

## 2023-04-05 NOTE — CASE MANAGEMENT/SOCIAL WORK
Continued Stay Note  [facility]     Patient Name: Taran Boswell  MRN: [id number]  Today's Date: 4/5/2023    Admit Date: 4/3/2023    Plan: Home   Discharge Plan     Row Name 04/05/23 1228       Plan    Plan Home      Patient/Family in Agreement with Plan yes    Plan Comments Anticipate Mr. Sandoval could be medically ready for discharge today. Physical and occupational therapy recommend home at discharge, he is agreeable. No discharge needs identified. Family will be available for transportation home.      Final Discharge Disposition Code 01 - home or self-care               Discharge Codes    No documentation.               Expected Discharge Date and Time     Expected Discharge Date Expected Discharge Time    Apr 5, 2023             Genia Durand RN

## 2023-04-05 NOTE — CONSULTS
Mr. Boswell was seen for diabetes ed per protocol. He states he had gastric bypass surgery several years ago and had rapid weight loss of 200 lbs. Since that time he has kept his a1c within normal limits with diet and lifestyle changes alone. Congratulated patient successes and advised lifestyle compliance and a1c checks per provider recommendations.    Discussed and taught patient about type 2 diabetes self-management, risk factors, and importance of blood glucose control to reduce complications. Target blood glucose readings and A1c goals per ADA were reviewed. Reviewed with patient current A1c 5.1 and discussed its significance. Signs, symptoms, and treatment of hyperglycemia and hypoglycemia were discussed. Lifestyle changes such as physical activity with MD approval and healthy eating were encouraged. Stressed the importance of strict blood sugar control after surgery to prevent complications such as infection and to promote healing of incision. Not a candidate for the outpatient stroke / diabetes class as patient has contraindications to antihyperglycemic therapy at this time with a1c WNL per ADA recs.Thank you for this referral.

## 2023-04-05 NOTE — PLAN OF CARE
Goal Outcome Evaluation:  Plan of Care Reviewed With: patient        Progress: improving  Outcome Evaluation: PT initial eval completed. Pt with no deficits requiring skilled IP PT at this time. Pt ambulated 500ft indep and navigated 6 steps with unilateral handrail. Will d/c PT orders at this time. d/c rec for home with assist.

## 2023-04-05 NOTE — PROGRESS NOTES
Neurology Note    Patient:  Taran Boswell    YOB: 1960    REFERRING PHYSICIAN:  Loni Qiu MD    CHIEF COMPLAINT:    Dizziness     HISTORY OF PRESENT ILLNESS:   The patient reports feeling back to baseline, vitals stable, H/H stable. Seen by janay FLOYD for ASA 81 mg, outpt F/U. Able to eat and walk w/o help.    Past Medical History:  Past Medical History:   Diagnosis Date   • Anemia    • Atrial fibrillation    • Bilateral arm fractures     Status post fall, treated with cast   • Diabetes    • History of permanent cardiac pacemaker placement    • Hypertension    • JUAN on CPAP    • Osteoarthritis     Bilateral knees       Past Surgical History:  Past Surgical History:   Procedure Laterality Date   • CARDIAC CATHETERIZATION      [facility], patient states he did not have blockage   • CHOLECYSTECTOMY     • COLONOSCOPY      6 or 7 years ago   • GASTRIC BYPASS     • HAND TENDON SURGERY Right     Dupuytren's   • UMBILICAL HERNIA REPAIR         Social History:   Social History     Socioeconomic History   • Marital status:    • Number of children: 2   Tobacco Use   • Smoking status: Former     Types: Cigarettes   • Tobacco comments:     Remote tobacco smoking only for a couple years quitting when he was 18 years of age   Vaping Use   • Vaping Use: Never used   Substance and Sexual Activity   • Alcohol use: Never   • Drug use: Never        Family History:   No family history on file.    Medications Prior to Admission:    Prior to Admission medications    Medication Sig Start Date End Date Taking? Authorizing Provider   allopurinol (ZYLOPRIM) 300 MG tablet Take 1 tablet by mouth Daily.   Yes ProviderKasi MD   amLODIPine-benazepril (LOTREL 5-20) 5-20 MG per capsule Take 1 capsule by mouth Daily.   Yes ProviderKasi MD   metoprolol succinate XL (TOPROL-XL) 100 MG 24 hr tablet Take 1 tablet by mouth Daily.   Yes ProviderKasi MD   naproxen (NAPROSYN) 500 MG tablet  Take 1 tablet by mouth 2 (Two) Times a Day As Needed for Mild Pain.   Yes Provider, MD Kasi   omeprazole (priLOSEC) 40 MG capsule Take 1 capsule by mouth Daily.   Yes Provider, MD Kasi   sertraline (ZOLOFT) 100 MG tablet Take 1 tablet by mouth Daily.   Yes Provider, MD Kasi       Allergies:  Bupropion and Sulfa antibiotics      Review of system  Review of Systems   HENT: Negative for trouble swallowing.    Musculoskeletal: Negative for gait problem.   Neurological: Negative for dizziness, speech difficulty, weakness and numbness.   All other systems reviewed and are negative.      Vitals:    04/05/23 0745   BP:    Pulse: 72   Resp: 18   Temp: 97.9 °F (36.6 °C)   SpO2: 99%       Physical exam  Physical Exam  Cardiovascular:      Rate and Rhythm: Normal rate. Rhythm irregular.   Pulmonary:      Effort: Pulmonary effort is normal.   Neurological:      Comments: Speech clear, no facial droop, moves limbs well           Lab Results   Component Value Date    WBC 5.94 04/03/2023    HGB 8.2 (L) 04/03/2023    HCT 29.9 (L) 04/03/2023    MCV 77.1 (L) 04/03/2023     04/03/2023     Lab Results   Component Value Date    GLUCOSE 108 (H) 04/03/2023    BUN 16 04/03/2023    CREATININE 1.24 04/03/2023    BCR 12.9 04/03/2023    CO2 26.0 04/03/2023    CALCIUM 8.7 04/03/2023    ALBUMIN 3.9 04/03/2023    AST 21 04/03/2023    ALT 21 04/03/2023         Radiological Studies:  Adult Transthoracic Echo Complete W/ Cont if Necessary Per Protocol (With Agitated Saline)    Result Date: 4/4/2023  •  Left ventricular systolic function is normal. Left ventricular ejection fraction appears to be 51 - 55%. •  Left atrial volume is severely increased. •  Saline test results are negative. •  Mild mitral valve regurgitation is present •  Mild tricuspid valve regurgitation is present. •  Estimated right ventricular systolic pressure from tricuspid regurgitation is moderately elevated (45-55 mmHg).     CT Head Without  Contrast    Result Date: 4/4/2023  CT HEAD WO CONTRAST Date of Exam: 4/4/2023 6:17 PM EDT Indication: Stroke, follow up 24 Hours Post Thrombolytic Administration. Comparison: April 3, 2023 Technique: Axial CT images were obtained of the head without contrast administration.  Reconstructed coronal and sagittal images were also obtained. Automated exposure control and iterative construction methods were used. Findings: There are white matter changes involving both cerebral hemispheres which could reflect more chronic small vessel ischemic change. There is some calcification in the V4 segment left vertebral artery. There is no underlying hemorrhage. There is no midline shift. There is no acute abnormality of the calvarium. The paranasal sinuses and mastoids seem clear.     Impression: 1.White matter changes involving the cerebral hemispheres which could reflect more chronic small vessel ischemic change. MRI may be of benefit to reassess this patient as thought clinically appropriate in the setting of stroke. Electronically Signed: Adrien Orta  4/4/2023 11:06 PM EDT  Workstation ID: [id number]    CT Outside Films    Result Date: 4/4/2023  This procedure was auto-finalized with no dictation required.    CT Outside Films    Result Date: 4/4/2023  This procedure was auto-finalized with no dictation required.        During this visit the following were done:  Labs Reviewed [x]    Labs Ordered []    Radiology Reports Reviewed [x]    Radiology Ordered []    EKG, echo, and/or stress test reviewed [x]    EEG results reviewed  []    EEG reviewed and interpreted per myself   []    Discussed case with neurointerventionalist or neuroradiologist []    Referring Provider Records Reviewed []    ER Records Reviewed []    Hospital Records Reviewed []    History Obtained From Family []    Radiological images view and Interpreted per myself [x]    Case Discussed with referring provider []     Decision to obtain and request outside records   []        Assessment and Plan     Stroke-like symptoms including vertigo, slurred speech and numbness, resolved s/p IV TPA. Outside CTA with reported severe right VA stenosis. To my eye the right VA appears hypoplastic. Chronic A.fib. Not on AC 22 h/o suspected GIB with reportedly negative w/u, persistent microcytic anemia. H/O gastric bypass. 24 hour CTH negative to my eye. Unable to have an MRI 22 PPM. Okay for ASA 81 mg per GI.   - Neurologically stable for discharge.   - -130, DBP<80, avoid hypotension.   - Home on ECASA 81 mg and statin.   - Stroke clinic F/U in 2 months.                 Electronically signed by Bryan Randall MD on 4/5/2023 at 10:44 EDT

## 2023-04-05 NOTE — PLAN OF CARE
Goal Outcome Evaluation:      Neuro exam stable, vitals within appropriate parameters, CT stable, no c/o pain, plan for discharge home today

## 2023-04-05 NOTE — THERAPY DISCHARGE NOTE
Acute Care - Occupational Therapy Discharge  [facility]    Patient Name: Taran Boswell  : 1960    MRN: [id number]                              Today's Date: 2023       Admit Date: 4/3/2023    Visit Dx:     ICD-10-CM ICD-9-CM   1. Examination for normal comparison for clinical research  Z00.6 V70.7     Patient Active Problem List   Diagnosis   • Stenosis of right vertebral artery   • Atrial fibrillation, persistent   • Anemia   • History of GI bleed   • Vertebral artery stenosis     Past Medical History:   Diagnosis Date   • Anemia    • Atrial fibrillation    • Bilateral arm fractures     Status post fall, treated with cast   • Diabetes    • History of permanent cardiac pacemaker placement    • Hypertension    • JUAN on CPAP    • Osteoarthritis     Bilateral knees     Past Surgical History:   Procedure Laterality Date   • CARDIAC CATHETERIZATION      [facility], patient states he did not have blockage   • CHOLECYSTECTOMY     • COLONOSCOPY      6 or 7 years ago   • GASTRIC BYPASS     • HAND TENDON SURGERY Right     [facility]   • UMBILICAL HERNIA REPAIR        General Information     Row Name 23 1047          OT Time and Intention    Document Type evaluation  -CS     Mode of Treatment occupational therapy  -CS     Row Name 23 1047          General Information    Patient Profile Reviewed yes  -CS     Prior Level of Function independent:;all household mobility;ADL's  -CS     Existing Precautions/Restrictions no known precautions/restrictions  -CS     Barriers to Rehab none identified  -CS     Row Name 23 1047          Living Environment    People in Home spouse;grandchild(belkis)  -CS     Row Name 23 1047          Home Main Entrance    Number of Stairs, Main Entrance four  -CS     Stair Railings, Main Entrance none  -CS     Row Name 23 1047          Cognition    Orientation Status (Cognition) oriented x 4  -CS           User Key  (r) = Recorded By, (t) = Taken By, (c) =  Cosigned By    Initials Name Provider Type    CS Hue Roque OT Occupational Therapist               Mobility/ADL's     Row Name 04/05/23 1047          Transfers    Transfers sit-stand transfer;stand-sit transfer  -[facility] Name 04/05/23 1047          Sit-Stand Transfer    Sit-Stand [place] (Transfers) independent  -     Row Name 04/05/23 1047          Stand-Sit Transfer    Stand-Sit [place] (Transfers) independent  -[facility] Name 04/05/23 1047          Functional Mobility    Functional Mobility- Ind. Level standby assist  -     Functional Mobility-Distance (Feet) --  household distance  -[facility] Name 04/05/23 1047          Activities of Daily Living    BADL Assessment/Intervention bathing;lower body dressing;grooming;feeding  -[facility] Name 04/05/23 1047          Bathing Assessment/Intervention    [place] Level (Bathing) perineal area;lower body;proximal lower extremities;independent  -[facility] Name 04/05/23 1047          Lower Body Dressing Assessment/Training    [place] Level (Lower Body Dressing) don;pants/bottoms;socks;independent  -CS     Position (Lower Body Dressing) supported sitting  -[facility] Name 04/05/23 1047          Grooming Assessment/Training    [place] Level (Grooming) wash face, hands;independent  -CS     Position (Grooming) supported sitting  -[facility] Name 04/05/23 1047          Self-Feeding Assessment/Training    [place] Level (Feeding) liquids to mouth;independent  -CS     Position (Self-Feeding) supported sitting  -CS           User Key  (r) = Recorded By, (t) = Taken By, (c) = Cosigned By    Initials Name Provider Type    Hue King OT Occupational Therapist               Obj/Interventions     Row Name 04/05/23 1049          Sensory Assessment (Somatosensory)    Sensory Assessment (Somatosensory) UE sensation intact  -[facility] Name 04/05/23 1049          Vision Assessment/Intervention    Visual Impairment/Limitations  WFL;corrective lenses full-time  -CS     Row Name 04/05/23 1049          Range of Motion Comprehensive    General Range of Motion bilateral upper extremity ROM WFL  -CS     Row Name 04/05/23 1049          Strength Comprehensive (MMT)    Comment, General Manual Muscle Testing (MMT) Assessment BUE grossly WFL  -CS     Row Name 04/05/23 1049          Motor Skills    Motor Skills coordination  -     Coordination bilateral;upper extremity;finger to nose;WFL  -     Row Name 04/05/23 1049          Balance    Balance Assessment sitting static balance;sitting dynamic balance;standing static balance;standing dynamic balance  -CS     Static Sitting Balance independent  -CS     Dynamic Sitting Balance independent  -CS     Position, Sitting Balance sitting edge of bed  -CS     Static Standing Balance independent  -CS     Dynamic Standing Balance independent  -CS     Position/Device Used, Standing Balance unsupported  -CS     Balance Interventions sitting;standing;static;dynamic;dynamic reaching;occupation based/functional task;weight shifting activity  -CS           User Key  (r) = Recorded By, (t) = Taken By, (c) = Cosigned By    Initials Name Provider Type    CS Hue Roque, JOHNATHON Occupational Therapist               Goals/Plan    No documentation.                Clinical Impression     Row Name 04/05/23 1049          Pain Assessment    Pretreatment Pain Rating 0/10 - no pain  -CS     Posttreatment Pain Rating 0/10 - no pain  -CS     Row Name 04/05/23 1049          Plan of Care Review    Plan of Care Reviewed With patient  -CS     Progress improving  -     Outcome Evaluation Pt performed ADLs independently and functional mobility w/ SBA d/t elevated HR; RN notified. Pt has no further deficits which require cont skilled IPOT POC, will sign off and defer further mobility to PT. Recommend pt DC home w/ assist.  -     Row Name 04/05/23 1049          Therapy Assessment/Plan (OT)    Patient/Family Therapy Goal Statement (OT)  Return home.  -CS     Criteria for Skilled Therapeutic Interventions Met (OT) no problems identified which require skilled intervention  -CS     Therapy Frequency (OT) evaluation only  -CS     Row Name 04/05/23 1049          Therapy Plan Review/Discharge Plan (OT)    Anticipated Discharge Disposition (OT) home with assist  -CS     Row Name 04/05/23 1049          Vital Signs    Pre Systolic BP Rehab 117  -CS     Pre Treatment Diastolic BP 63  -CS     Post Systolic BP Rehab 152  -CS     Post Treatment Diastolic BP 69  -CS     Pretreatment Heart Rate (beats/min) 75  -CS     Posttreatment Heart Rate (beats/min) 76  -CS     Pre SpO2 (%) 93  -CS     O2 Delivery Pre Treatment room air  -CS     Post SpO2 (%) 94  -CS     O2 Delivery Post Treatment room air  -CS     Pre Patient Position Sitting  -CS     Intra Patient Position Standing  -CS     Post Patient Position Sitting  -CS     Row Name 04/05/23 1049          Positioning and Restraints    Pre-Treatment Position sitting in chair/recliner  -CS     Post Treatment Position chair  -CS     In Chair notified nsg;reclined;call light within reach;encouraged to call for assist;exit alarm on;waffle cushion  -CS           User Key  (r) = Recorded By, (t) = Taken By, (c) = Cosigned By    Initials Name Provider Type    CS Hue Roque, OT Occupational Therapist               Outcome Measures     Row Name 04/05/23 1052          How much help from another is currently needed...    Putting on and taking off regular lower body clothing? 4  -CS     Bathing (including washing, rinsing, and drying) 4  -CS     Toileting (which includes using toilet bed pan or urinal) 4  -CS     Putting on and taking off regular upper body clothing 4  -CS     Taking care of personal grooming (such as brushing teeth) 4  -CS     Eating meals 4  -CS     AM-PAC 6 Clicks Score (OT) 24  -CS     Row Name 04/05/23 1052          Modified [place] Scale    Pre-Stroke Modified [place] Scale 0 - No Symptoms at all.  -CS      Modified [place] Scale 0 - No Symptoms at all.  -     Row Name 04/05/23 1052          Functional Assessment    Outcome Measure Options AM-PAC 6 Clicks Daily Activity (OT);Modified [place]  -           User Key  (r) = Recorded By, (t) = Taken By, (c) = Cosigned By    Initials Name Provider Type     Hue Roque OT Occupational Therapist              Occupational Therapy Education     Title: PT OT SLP Therapies (In Progress)     Topic: Occupational Therapy (In Progress)     Point: ADL training (Done)     Description:   Instruct learner(s) on proper safety adaptation and remediation techniques during self care or transfers.   Instruct in proper use of assistive devices.              Learning Progress Summary           Patient  by  at 4/5/2023 1052    Acceptance, E, VU by  at 4/5/2023 1052                   Point: Home exercise program (Not Started)     Description:   Instruct learner(s) on appropriate technique for monitoring, assisting and/or progressing therapeutic exercises/activities.              Learner Progress:  Not documented in this visit.          Point: Precautions (Done)     Description:   Instruct learner(s) on prescribed precautions during self-care and functional transfers.              Learning Progress Summary           Patient  by  at 4/5/2023 1052    Acceptance, E, VU by  at 4/5/2023 1052                   Point: Body mechanics (Done)     Description:   Instruct learner(s) on proper positioning and spine alignment during self-care, functional mobility activities and/or exercises.              Learning Progress Summary           Patient  by  at 4/5/2023 1052    Acceptance, E, VU by  at 4/5/2023 1052                               User Key     Initials Effective Dates Name Provider Type Discipline     09/02/21 -  Hue Roque OT Occupational Therapist OT              OT Recommendation and Plan  Therapy Frequency (OT): evaluation only  Plan of Care Review  Plan of Care Reviewed  With: patient  Progress: improving  Outcome Evaluation: Pt performed ADLs independently and functional mobility w/ SBA d/t elevated HR; RN notified. Pt has no further deficits which require cont skilled IPOT POC, will sign off and defer further mobility to PT. Recommend pt DC home w/ assist.  Plan of Care Reviewed With: patient  Outcome Evaluation: Pt performed ADLs independently and functional mobility w/ SBA d/t elevated HR; RN notified. Pt has no further deficits which require cont skilled IPOT POC, will sign off and defer further mobility to PT. Recommend pt DC home w/ assist.     Time Calculation:    Time Calculation- OT     Row Name 04/05/23 1054             Time Calculation- OT    OT Start Time 0840  -CS      OT Received On 04/05/23  -CS      OT Goal Re-Cert Due Date 04/15/23  -CS         Untimed Charges    OT Eval/Re-eval Minutes 46  -CS         Total Minutes    Untimed Charges Total Minutes 46  -CS       Total Minutes 46  -CS            User Key  (r) = Recorded By, (t) = Taken By, (c) = Cosigned By    Initials Name Provider Type    CS Hue Roque OT Occupational Therapist              Therapy Charges for Today     Code Description Service Date Service Provider Modifiers Qty    [id number] HC OT EVAL LOW COMPLEXITY 4 4/5/2023 Hue Roque OT GO 1             OT Discharge Summary  Anticipated Discharge Disposition (OT): home with assist  Reason for Discharge: At baseline function  Outcomes Achieved: Refer to plan of care for updates on goals achieved  Discharge Destination: Home with assist    Hue Roque OT  4/5/2023

## 2023-04-05 NOTE — PLAN OF CARE
Goal Outcome Evaluation:  Plan of Care Reviewed With: patient        Progress: improving  Outcome Evaluation: Pt performed ADLs independently and functional mobility w/ SBA d/t elevated HR; RN notified. Pt has no further deficits which require cont skilled IPOT POC, will sign off and defer further mobility to PT. Recommend pt DC home w/ assist.

## 2023-12-18 RX ORDER — ASPIRIN 81 MG/1
81 TABLET, COATED ORAL DAILY
Qty: 30 TABLET | Refills: 3 | OUTPATIENT
Start: 2023-12-18